# Patient Record
Sex: FEMALE | Race: WHITE | URBAN - NONMETROPOLITAN AREA
[De-identification: names, ages, dates, MRNs, and addresses within clinical notes are randomized per-mention and may not be internally consistent; named-entity substitution may affect disease eponyms.]

---

## 2019-03-09 LAB
ALBUMIN SERPL-MCNC: 4.5 G/DL
ALP BLD-CCNC: 37 U/L
ALT SERPL-CCNC: 14 U/L
ANION GAP SERPL CALCULATED.3IONS-SCNC: 1.9 MMOL/L
AST SERPL-CCNC: 19 U/L
BASOPHILS ABSOLUTE: 10 /ΜL
BASOPHILS RELATIVE PERCENT: 0 %
BILIRUB SERPL-MCNC: 0.5 MG/DL (ref 0.1–1.4)
BUN BLDV-MCNC: 19 MG/DL
CALCIUM SERPL-MCNC: 9.4 MG/DL
CHLORIDE BLD-SCNC: 105 MMOL/L
CHOLESTEROL, TOTAL: 207 MG/DL
CHOLESTEROL/HDL RATIO: 4.5
CO2: 27 MMOL/L
CREAT SERPL-MCNC: 0.86 MG/DL
EOSINOPHILS ABSOLUTE: 50 /ΜL
EOSINOPHILS RELATIVE PERCENT: 1 %
GFR CALCULATED: 82
GLUCOSE BLD-MCNC: 83 MG/DL
HCT VFR BLD CALC: 40 % (ref 36–46)
HDLC SERPL-MCNC: 46 MG/DL (ref 35–70)
HEMOGLOBIN: 13.4 G/DL (ref 12–16)
LDL CHOLESTEROL CALCULATED: 143 MG/DL (ref 0–160)
LYMPHOCYTES ABSOLUTE: 1580 /ΜL
LYMPHOCYTES RELATIVE PERCENT: 30 %
MCH RBC QN AUTO: 29.8 PG
MCHC RBC AUTO-ENTMCNC: 33.7 G/DL
MCV RBC AUTO: 88.87 FL
MONOCYTES ABSOLUTE: 410 /ΜL
MONOCYTES RELATIVE PERCENT: 8 %
NEUTROPHILS ABSOLUTE: 3270 /ΜL
NEUTROPHILS RELATIVE PERCENT: 61 %
PLATELET # BLD: 268 K/ΜL
PMV BLD AUTO: 9.6 FL
POTASSIUM SERPL-SCNC: 3.8 MMOL/L
RBC # BLD: 4.51 10^6/ΜL
SODIUM BLD-SCNC: 140 MMOL/L
TOTAL PROTEIN: 6.8
TRIGL SERPL-MCNC: 79 MG/DL
TSH SERPL DL<=0.05 MIU/L-ACNC: 2.79 UIU/ML
VLDLC SERPL CALC-MCNC: 161 MG/DL
WBC # BLD: 5.3 10^3/ML

## 2019-06-27 ENCOUNTER — OFFICE VISIT (OUTPATIENT)
Dept: FAMILY MEDICINE CLINIC | Age: 44
End: 2019-06-27
Payer: COMMERCIAL

## 2019-06-27 VITALS
DIASTOLIC BLOOD PRESSURE: 72 MMHG | HEIGHT: 64 IN | WEIGHT: 149 LBS | SYSTOLIC BLOOD PRESSURE: 124 MMHG | BODY MASS INDEX: 25.44 KG/M2 | HEART RATE: 79 BPM | OXYGEN SATURATION: 97 %

## 2019-06-27 DIAGNOSIS — L24.7 CONTACT DERMATITIS AND ECZEMA DUE TO PLANT: Primary | ICD-10-CM

## 2019-06-27 PROCEDURE — 99213 OFFICE O/P EST LOW 20 MIN: CPT | Performed by: INTERNAL MEDICINE

## 2019-06-27 PROCEDURE — 96372 THER/PROPH/DIAG INJ SC/IM: CPT | Performed by: INTERNAL MEDICINE

## 2019-06-27 RX ORDER — PREDNISONE 10 MG/1
TABLET ORAL
Qty: 30 TABLET | Refills: 0 | Status: SHIPPED | OUTPATIENT
Start: 2019-06-27 | End: 2019-08-30

## 2019-06-27 RX ORDER — METHYLPREDNISOLONE ACETATE 40 MG/ML
40 INJECTION, SUSPENSION INTRA-ARTICULAR; INTRALESIONAL; INTRAMUSCULAR; SOFT TISSUE ONCE
Status: COMPLETED | OUTPATIENT
Start: 2019-06-27 | End: 2019-06-27

## 2019-06-27 RX ORDER — ESCITALOPRAM OXALATE 10 MG/1
TABLET ORAL
Refills: 0 | COMMUNITY
Start: 2019-05-31 | End: 2019-09-04 | Stop reason: DRUGHIGH

## 2019-06-27 RX ADMIN — METHYLPREDNISOLONE ACETATE 40 MG: 40 INJECTION, SUSPENSION INTRA-ARTICULAR; INTRALESIONAL; INTRAMUSCULAR; SOFT TISSUE at 08:56

## 2019-06-27 NOTE — PROGRESS NOTES
serous drainage. She currently has he is covered. Lesions all typical of a contact dermatitis    ASSESSMENT/PLAN:    ICD-10-CM    1. Contact dermatitis and eczema due to plant L24.7       I given her a Depo-Medrol injection today to be followed tomorrow by tapering dose of prednisone. She may also use Benadryl or Zyrtec for some symptom relief. No follow-ups on file. An electronic signature was used to authenticate this note.     --Meron Beaver,  on 6/27/2019 at 8:58 AM

## 2019-09-04 ENCOUNTER — OFFICE VISIT (OUTPATIENT)
Dept: FAMILY MEDICINE CLINIC | Age: 44
End: 2019-09-04
Payer: COMMERCIAL

## 2019-09-04 VITALS
OXYGEN SATURATION: 99 % | TEMPERATURE: 98.4 F | HEART RATE: 84 BPM | BODY MASS INDEX: 26.67 KG/M2 | DIASTOLIC BLOOD PRESSURE: 72 MMHG | WEIGHT: 156.25 LBS | HEIGHT: 64 IN | SYSTOLIC BLOOD PRESSURE: 120 MMHG

## 2019-09-04 DIAGNOSIS — Z12.39 SCREENING FOR MALIGNANT NEOPLASM OF BREAST: ICD-10-CM

## 2019-09-04 DIAGNOSIS — R00.2 PALPITATIONS: ICD-10-CM

## 2019-09-04 DIAGNOSIS — F41.9 ANXIETY: Primary | ICD-10-CM

## 2019-09-04 PROCEDURE — 99214 OFFICE O/P EST MOD 30 MIN: CPT | Performed by: FAMILY MEDICINE

## 2019-09-04 RX ORDER — ESCITALOPRAM OXALATE 20 MG/1
20 TABLET ORAL DAILY
Qty: 30 TABLET | Refills: 5 | Status: SHIPPED
Start: 2019-09-04 | End: 2020-02-25 | Stop reason: SDUPTHER

## 2019-09-04 RX ORDER — ESCITALOPRAM OXALATE 10 MG/1
TABLET ORAL
Qty: 30 TABLET | Refills: 5 | Status: CANCELLED | OUTPATIENT
Start: 2019-09-04

## 2019-09-04 ASSESSMENT — ENCOUNTER SYMPTOMS
VOMITING: 0
SINUS PAIN: 0
DIARRHEA: 0
NAUSEA: 0
ABDOMINAL PAIN: 0
EYE PAIN: 0
SHORTNESS OF BREATH: 0
SORE THROAT: 0
CHEST TIGHTNESS: 0
COUGH: 0
CONSTIPATION: 0
TROUBLE SWALLOWING: 0
WHEEZING: 0
BACK PAIN: 0

## 2019-09-04 ASSESSMENT — PATIENT HEALTH QUESTIONNAIRE - PHQ9
SUM OF ALL RESPONSES TO PHQ9 QUESTIONS 1 & 2: 0
2. FEELING DOWN, DEPRESSED OR HOPELESS: 0
SUM OF ALL RESPONSES TO PHQ QUESTIONS 1-9: 0
SUM OF ALL RESPONSES TO PHQ QUESTIONS 1-9: 0
1. LITTLE INTEREST OR PLEASURE IN DOING THINGS: 0

## 2019-09-04 NOTE — PROGRESS NOTES
19    Name: Sandeep Anguiano  :1975   Sex:female   Age:44 y.o. Chief Complaint   Patient presents with    Anxiety     Here for follow up on anxiety. Patient says that she has been under more stress lately and is having panic attacks  . She feels like she is getting palpitations and gets chest pains. She says this has been happening about once a week. Work is her trigger right now  People are quitting and her work load is getting worse    Palpitations occasionally  No chaest pain  Stress  No IBS and no gastric issues    lexapro working but she agrees a higher dose might be helpful right now    Mammogram is this coming monday    Review of Systems   Constitutional: Negative for appetite change, fatigue and fever. HENT: Negative for congestion, ear pain, sinus pain, sore throat and trouble swallowing. Eyes: Negative for pain. Respiratory: Negative for cough, chest tightness, shortness of breath and wheezing. Cardiovascular: Positive for palpitations. Negative for chest pain and leg swelling. Gastrointestinal: Negative for abdominal pain, constipation, diarrhea, nausea and vomiting. Endocrine: Negative for cold intolerance and heat intolerance. Genitourinary: Negative for difficulty urinating, hematuria and pelvic pain. Musculoskeletal: Negative for back pain, gait problem and joint swelling. Skin: Negative for rash and wound. Neurological: Negative for dizziness, syncope and headaches. Hematological: Negative for adenopathy. Psychiatric/Behavioral: Negative for confusion, self-injury, sleep disturbance and suicidal ideas. The patient is nervous/anxious.             Current Outpatient Medications:     escitalopram (LEXAPRO) 20 MG tablet, Take 1 tablet by mouth daily, Disp: 30 tablet, Rfl: 5  Allergies   Allergen Reactions    Homatropine     Iodine     Penicillin G     Ranitidine Hives      Past Medical History:   Diagnosis Date    Anxiety     Gastritis     Irritable

## 2019-12-31 ENCOUNTER — OFFICE VISIT (OUTPATIENT)
Dept: FAMILY MEDICINE CLINIC | Age: 44
End: 2019-12-31
Payer: COMMERCIAL

## 2019-12-31 VITALS
OXYGEN SATURATION: 95 % | DIASTOLIC BLOOD PRESSURE: 62 MMHG | HEIGHT: 63 IN | TEMPERATURE: 98.3 F | BODY MASS INDEX: 30.88 KG/M2 | SYSTOLIC BLOOD PRESSURE: 116 MMHG | HEART RATE: 117 BPM | WEIGHT: 174.3 LBS

## 2019-12-31 DIAGNOSIS — J06.9 ACUTE URI: ICD-10-CM

## 2019-12-31 DIAGNOSIS — R50.9 FEVER, UNSPECIFIED FEVER CAUSE: Primary | ICD-10-CM

## 2019-12-31 LAB
INFLUENZA A ANTIBODY: NEGATIVE
INFLUENZA B ANTIBODY: NEGATIVE

## 2019-12-31 PROCEDURE — 99213 OFFICE O/P EST LOW 20 MIN: CPT | Performed by: PHYSICIAN ASSISTANT

## 2019-12-31 PROCEDURE — 87804 INFLUENZA ASSAY W/OPTIC: CPT | Performed by: PHYSICIAN ASSISTANT

## 2019-12-31 RX ORDER — AZITHROMYCIN 250 MG/1
250 TABLET, FILM COATED ORAL SEE ADMIN INSTRUCTIONS
Qty: 6 TABLET | Refills: 0 | Status: SHIPPED | OUTPATIENT
Start: 2019-12-31 | End: 2020-01-05

## 2019-12-31 RX ORDER — BROMPHENIRAMINE MALEATE, PSEUDOEPHEDRINE HYDROCHLORIDE, AND DEXTROMETHORPHAN HYDROBROMIDE 2; 30; 10 MG/5ML; MG/5ML; MG/5ML
5 SYRUP ORAL 4 TIMES DAILY PRN
Qty: 120 ML | Refills: 0 | Status: SHIPPED
Start: 2019-12-31 | End: 2020-02-24

## 2019-12-31 RX ORDER — PREDNISONE 20 MG/1
TABLET ORAL
Qty: 10 TABLET | Refills: 0 | Status: SHIPPED
Start: 2019-12-31 | End: 2020-02-24

## 2020-02-24 ENCOUNTER — OFFICE VISIT (OUTPATIENT)
Dept: FAMILY MEDICINE CLINIC | Age: 45
End: 2020-02-24
Payer: COMMERCIAL

## 2020-02-24 VITALS
HEIGHT: 63 IN | OXYGEN SATURATION: 98 % | WEIGHT: 165 LBS | BODY MASS INDEX: 29.23 KG/M2 | DIASTOLIC BLOOD PRESSURE: 88 MMHG | SYSTOLIC BLOOD PRESSURE: 130 MMHG | TEMPERATURE: 98.5 F | RESPIRATION RATE: 18 BRPM | HEART RATE: 73 BPM

## 2020-02-24 PROCEDURE — 99213 OFFICE O/P EST LOW 20 MIN: CPT | Performed by: PHYSICIAN ASSISTANT

## 2020-02-24 RX ORDER — METHYLPREDNISOLONE 4 MG/1
TABLET ORAL
Qty: 1 KIT | Refills: 0 | Status: SHIPPED
Start: 2020-02-24 | End: 2020-08-25 | Stop reason: ALTCHOICE

## 2020-02-24 ASSESSMENT — ENCOUNTER SYMPTOMS
RESPIRATORY NEGATIVE: 1
SHORTNESS OF BREATH: 0
STRIDOR: 0
EYES NEGATIVE: 1
GASTROINTESTINAL NEGATIVE: 1
SORE THROAT: 0
COUGH: 0
CHOKING: 0
CHEST TIGHTNESS: 0
WHEEZING: 0
FACIAL SWELLING: 0
APNEA: 0

## 2020-02-24 NOTE — PROGRESS NOTES
Chief Complaint   Patient presents with    Allergic Reaction     pt states her lips have been swelling, it is pt bottom lip. HPI:  Patient presents today for swelling of the lips over the last couple weeks on and off. She took a Claritin while in the waiting room and it did go down today. No shortness of breath, tongue or throat swelling. Current Outpatient Medications:     methylPREDNISolone (MEDROL DOSEPACK) 4 MG tablet, Take by mouth., Disp: 1 kit, Rfl: 0    escitalopram (LEXAPRO) 20 MG tablet, Take 1 tablet by mouth daily, Disp: 30 tablet, Rfl: 5       Allergies   Allergen Reactions    Homatropine     Iodine     Penicillin G     Ranitidine Hives         Review of Systems  Review of Systems   Constitutional: Negative. Negative for chills, fatigue and fever. HENT: Negative for congestion, facial swelling (Bottom lip slightly swollen) and sore throat. Eyes: Negative. Respiratory: Negative. Negative for apnea, cough, choking, chest tightness, shortness of breath, wheezing and stridor. Cardiovascular: Negative. Negative for chest pain and leg swelling. Gastrointestinal: Negative. VS:  /88   Pulse 73   Temp 98.5 °F (36.9 °C) (Temporal)   Resp 18   Ht 5' 3\" (1.6 m)   Wt 165 lb (74.8 kg)   SpO2 98%   BMI 29.23 kg/m²     Patient's medical, social, and family history reviewed      Physical Exam  Physical Exam  Vitals signs and nursing note reviewed. Constitutional:       General: She is not in acute distress. Appearance: Normal appearance. She is normal weight. She is not ill-appearing or toxic-appearing. HENT:      Head: Normocephalic. Right Ear: Tympanic membrane, ear canal and external ear normal. There is no impacted cerumen. Left Ear: Tympanic membrane, ear canal and external ear normal. There is no impacted cerumen. Nose: Nose normal. No congestion or rhinorrhea.       Mouth/Throat:      Mouth: Mucous membranes are moist. Angioedema

## 2020-02-25 ENCOUNTER — OFFICE VISIT (OUTPATIENT)
Dept: FAMILY MEDICINE CLINIC | Age: 45
End: 2020-02-25
Payer: COMMERCIAL

## 2020-02-25 VITALS
OXYGEN SATURATION: 96 % | SYSTOLIC BLOOD PRESSURE: 136 MMHG | HEART RATE: 90 BPM | TEMPERATURE: 97.9 F | HEIGHT: 63 IN | WEIGHT: 171 LBS | DIASTOLIC BLOOD PRESSURE: 86 MMHG | BODY MASS INDEX: 30.3 KG/M2

## 2020-02-25 PROCEDURE — 99396 PREV VISIT EST AGE 40-64: CPT | Performed by: FAMILY MEDICINE

## 2020-02-25 RX ORDER — PANTOPRAZOLE SODIUM 20 MG/1
20 TABLET, DELAYED RELEASE ORAL NIGHTLY
Qty: 90 TABLET | Refills: 1 | Status: SHIPPED
Start: 2020-02-25 | End: 2020-08-25 | Stop reason: SDUPTHER

## 2020-02-25 RX ORDER — ESCITALOPRAM OXALATE 20 MG/1
20 TABLET ORAL DAILY
Qty: 90 TABLET | Refills: 1 | Status: SHIPPED
Start: 2020-02-25 | End: 2020-08-25 | Stop reason: SDUPTHER

## 2020-02-25 ASSESSMENT — ENCOUNTER SYMPTOMS
WHEEZING: 0
CONSTIPATION: 0
SINUS PAIN: 0
SORE THROAT: 0
CHEST TIGHTNESS: 0
ABDOMINAL PAIN: 0
VOMITING: 0
BACK PAIN: 0
TROUBLE SWALLOWING: 0
DIARRHEA: 0
NAUSEA: 0
SHORTNESS OF BREATH: 0
EYE PAIN: 0
COUGH: 0

## 2020-02-25 ASSESSMENT — PATIENT HEALTH QUESTIONNAIRE - PHQ9
2. FEELING DOWN, DEPRESSED OR HOPELESS: 0
1. LITTLE INTEREST OR PLEASURE IN DOING THINGS: 0
SUM OF ALL RESPONSES TO PHQ QUESTIONS 1-9: 0
SUM OF ALL RESPONSES TO PHQ9 QUESTIONS 1 & 2: 0
SUM OF ALL RESPONSES TO PHQ QUESTIONS 1-9: 0

## 2020-02-25 NOTE — PROGRESS NOTES
round, and reactive to light. Neck:      Musculoskeletal: Normal range of motion. Cardiovascular:      Rate and Rhythm: Normal rate and regular rhythm. Pulmonary:      Effort: Pulmonary effort is normal.      Breath sounds: Normal breath sounds. Abdominal:      General: Bowel sounds are normal.      Palpations: Abdomen is soft. Musculoskeletal: Normal range of motion. Skin:     General: Skin is warm and dry. Neurological:      General: No focal deficit present. Mental Status: She is alert and oriented to person, place, and time. Bebe Sanon was seen today for anxiety. Diagnoses and all orders for this visit:    Encounter for well adult exam without abnormal findings  Comments:  labs this week  mammogram done    Anxiety  Comments:  stable with lexapro  Orders:  -     escitalopram (LEXAPRO) 20 MG tablet; Take 1 tablet by mouth daily    Palpitations  -     escitalopram (LEXAPRO) 20 MG tablet; Take 1 tablet by mouth daily    Gastroesophageal reflux disease with esophagitis  Comments:  add protonix and see if this helps  watch diet  f/u in 1 to 3 mths ifnecessary    Allergic reaction, subsequent encounter  Comments:  claritin daily  benedryl as needed  will refer to allergist    Other orders  -     pantoprazole (PROTONIX) 20 MG tablet; Take 1 tablet by mouth nightly        I independently reviewed and updated the chief complaint, HPI, past medical and surgical history, medications, allergies and ROS as entered by the LPN. Seen by:   Pawel Dunn DO

## 2020-07-16 ENCOUNTER — TELEPHONE (OUTPATIENT)
Dept: FAMILY MEDICINE CLINIC | Age: 45
End: 2020-07-16

## 2020-07-16 NOTE — TELEPHONE ENCOUNTER
[place]astity H[place] and has developed a UTI. She is asking if you would send an antibiotic to [facility] for this, and asking for a Diflucan as well.

## 2020-07-17 ENCOUNTER — HOSPITAL ENCOUNTER (OUTPATIENT)
Age: 45
Discharge: HOME | End: 2020-07-19
Payer: COMMERCIAL

## 2020-07-17 ENCOUNTER — OFFICE VISIT (OUTPATIENT)
Dept: FAMILY MEDICINE CLINIC | Age: 45
End: 2020-07-17
Payer: COMMERCIAL

## 2020-07-17 VITALS
HEART RATE: 80 BPM | DIASTOLIC BLOOD PRESSURE: 76 MMHG | HEIGHT: 64 IN | RESPIRATION RATE: 18 BRPM | OXYGEN SATURATION: 98 % | WEIGHT: 175 LBS | SYSTOLIC BLOOD PRESSURE: 120 MMHG | TEMPERATURE: 97.6 F | BODY MASS INDEX: 29.88 KG/M2

## 2020-07-17 LAB
BILIRUBIN, POC: NORMAL
BLOOD URINE, POC: NORMAL
CLARITY, POC: CLEAR
COLOR, POC: YELLOW
GLUCOSE URINE, POC: NORMAL
KETONES, POC: NORMAL
LEUKOCYTE EST, POC: NORMAL
NITRITE, POC: NEGATIVE
PH, POC: 7
PROTEIN, POC: NORMAL
SPECIFIC GRAVITY, POC: 1.01
UROBILINOGEN, POC: 0.2

## 2020-07-17 PROCEDURE — 87088 URINE BACTERIA CULTURE: CPT

## 2020-07-17 PROCEDURE — 81002 URINALYSIS NONAUTO W/O SCOPE: CPT | Performed by: PHYSICIAN ASSISTANT

## 2020-07-17 PROCEDURE — 87077 CULTURE AEROBIC IDENTIFY: CPT

## 2020-07-17 PROCEDURE — 99214 OFFICE O/P EST MOD 30 MIN: CPT | Performed by: PHYSICIAN ASSISTANT

## 2020-07-17 PROCEDURE — 87186 SC STD MICRODIL/AGAR DIL: CPT

## 2020-07-17 RX ORDER — FLUCONAZOLE 150 MG/1
TABLET ORAL
Qty: 2 TABLET | Refills: 0 | Status: SHIPPED
Start: 2020-07-17 | End: 2020-08-25 | Stop reason: ALTCHOICE

## 2020-07-17 RX ORDER — NITROFURANTOIN 25; 75 MG/1; MG/1
100 CAPSULE ORAL 2 TIMES DAILY
Qty: 14 CAPSULE | Refills: 0 | Status: SHIPPED | OUTPATIENT
Start: 2020-07-17 | End: 2020-07-24

## 2020-07-17 NOTE — PROGRESS NOTES
20  Jose Kaplan : 1975 Sex: female  Age 39 y.o. Subjective:  Chief Complaint   Patient presents with    Urinary Tract Infection     pt states dysuria, frequency for past 16 days          HPI:   Jose Kaplan , 39 y.o. female presents to [facility] care for evaluation of urinary tract infection. The patient has had a urinary tract infection ongoing for about 16 days now. The patient is not having any back pain, flank pain. The patient states that she had been trying to treated at home. The patient not having any fevers or chills. The patient denies any vaginal bleeding or vaginal discharge. The patient has had some increased frequency, urgency and burning with urination. The patient does get UTIs somewhat frequently. The patient has not been on any recent antibiotics. She has not been seen or evaluated for this previously. The patient has had previous tubal ligation. Denies chance of pregnancy. ROS:   Unless otherwise stated in this report the patient's positive and negative responses for review of systems for constitutional, eyes, ENT, cardiovascular, respiratory, gastrointestinal, neurological, , musculoskeletal, and integument systems and related systems to the presenting problem are either stated in the history of present illness or were not pertinent or were negative for the symptoms and/or complaints related to the presenting medical problem. Positives and pertinent negatives as per HPI. All others reviewed and are negative. PMH:     Past Medical History:   Diagnosis Date    Anxiety     Gastritis     Irritable bowel syndrome     Panic attack        Past Surgical History:   Procedure Laterality Date    NECK SURGERY  1996    lipoma removal    TUBAL LIGATION      WRIST SURGERY Right     ganglion cyst       No family history on file.     Medications:     Current Outpatient Medications:     nitrofurantoin, macrocrystal-monohydrate, (MACROBID) 100 MG capsule, Take 1 capsule by mouth 2 times daily for 7 days, Disp: 14 capsule, Rfl: 0    fluconazole (DIFLUCAN) 150 MG tablet, Take 1 now and then another at the end of the course of the antibiotic, Disp: 2 tablet, Rfl: 0    escitalopram (LEXAPRO) 20 MG tablet, Take 1 tablet by mouth daily, Disp: 90 tablet, Rfl: 1    pantoprazole (PROTONIX) 20 MG tablet, Take 1 tablet by mouth nightly, Disp: 90 tablet, Rfl: 1    methylPREDNISolone (MEDROL DOSEPACK) 4 MG tablet, Take by mouth. (Patient not taking: Reported on 7/17/2020), Disp: 1 kit, Rfl: 0    Allergies: Allergies   Allergen Reactions    Homatropine     Iodine     Penicillin G     Ranitidine Hives       Social History:     Social History     Tobacco Use    Smoking status: Never Smoker    Smokeless tobacco: Never Used   Substance Use Topics    Alcohol use: Yes     Comment: occasionally    Drug use: Never       Patient lives at home. Physical Exam:     Vitals:    07/17/20 0836   BP: 120/76   Pulse: 80   Resp: 18   Temp: 97.6 °F (36.4 °C)   SpO2: 98%   Weight: 175 lb (79.4 kg)   Height: 5' 4\" (1.626 m)       Exam:  Physical Exam  Nurses note and vital signs reviewed and patient is not hypoxic. General: The patient appears well and in no apparent distress. Patient is resting comfortably on cart. Skin: Warm, dry, no pallor noted. There is no rash noted. Head: Normocephalic, atraumatic. Eye: Normal conjunctiva  Ears, Nose, Mouth, and Throat: Oral mucosa is moist  Cardiovascular: Regular Rate and Rhythm  Respiratory: Patient is in no distress, no accessory muscle use, lungs are clear to auscultation, no wheezing, crackles or rhonchi  Back: Non-tender, no CVA tenderness bilaterally to percussion. GI: Normal bowel sounds, no tenderness to palpation, no masses appreciated. No rebound, guarding, or rigidity noted.   Musculoskeletal: The patient has no evidence of calf tenderness, no pitting edema, symmetrical pulses noted bilaterally  Neurological: A&O x4, normal speech        Testing:     Results for orders placed or performed in visit on 07/17/20   POCT Urinalysis no Micro   Result Value Ref Range    Color, UA yellow     Clarity, UA clear     Glucose, UA POC neg     Bilirubin, UA neg     Ketones, UA neg     Spec Grav, UA 1.015     Blood, UA POC trace     pH, UA 7.0     Protein, UA POC trace     Urobilinogen, UA 0.2     Leukocytes, UA small     Nitrite, UA negative            Medical Decision Making:     Vital signs reviewed    Past medical history reviewed. Allergies reviewed. Medications reviewed. Patient on arrival does not appear to be in any apparent distress or discomfort. . The patient had UA that did show evidence of a UTI. We did obtain a urine culture, which was sent to the lab for further evaluation. She denies chance of pregnancy. Had previous tubal ligation    Urinalysis did show evidence of a urinary tract infection. The patient will be started on Macrobid. The patient will have a urine culture set up. The patient will need a follow-up appointment with PCP to have repeat urinalysis. The patient is to follow up with PCP for results and we will make any necessary adjustments to the patient's medication regimen. The patient will go directly to ED if notes nausea, vomiting, back pain, fever, chills or worsening symptoms. The patient has no other concerns at this time. Clinical Impression:   Jean Troy was seen today for urinary tract infection. Diagnoses and all orders for this visit:    Urinary tract infection with hematuria, site unspecified    Dysuria    Other orders  -     POCT Urinalysis no Micro  -     Culture, Urine; Future  -     nitrofurantoin, macrocrystal-monohydrate, (MACROBID) 100 MG capsule; Take 1 capsule by mouth 2 times daily for 7 days  -     fluconazole (DIFLUCAN) 150 MG tablet;  Take 1 now and then another at the end of the course of the antibiotic        The patient is to call for any concerns or return if any of the signs or symptoms worsen. The patient is to follow-up with PCP in the next 2-3 days for repeat evaluation repeat assessment or go directly to the emergency department.      SIGNATURE: Rita Meza III, PA-C

## 2020-07-20 LAB
ORGANISM: ABNORMAL
URINE CULTURE, ROUTINE: ABNORMAL

## 2020-08-25 ENCOUNTER — OFFICE VISIT (OUTPATIENT)
Dept: FAMILY MEDICINE CLINIC | Age: 45
End: 2020-08-25
Payer: COMMERCIAL

## 2020-08-25 VITALS
BODY MASS INDEX: 30.72 KG/M2 | WEIGHT: 173.4 LBS | HEIGHT: 63 IN | SYSTOLIC BLOOD PRESSURE: 110 MMHG | OXYGEN SATURATION: 98 % | HEART RATE: 80 BPM | TEMPERATURE: 98.3 F | DIASTOLIC BLOOD PRESSURE: 70 MMHG

## 2020-08-25 PROCEDURE — 99214 OFFICE O/P EST MOD 30 MIN: CPT | Performed by: FAMILY MEDICINE

## 2020-08-25 PROCEDURE — 96372 THER/PROPH/DIAG INJ SC/IM: CPT | Performed by: FAMILY MEDICINE

## 2020-08-25 RX ORDER — ESCITALOPRAM OXALATE 20 MG/1
20 TABLET ORAL DAILY
Qty: 90 TABLET | Refills: 1 | Status: SHIPPED
Start: 2020-08-25 | End: 2021-02-23 | Stop reason: SDUPTHER

## 2020-08-25 RX ORDER — METHYLPREDNISOLONE ACETATE 80 MG/ML
80 INJECTION, SUSPENSION INTRA-ARTICULAR; INTRALESIONAL; INTRAMUSCULAR; SOFT TISSUE ONCE
Status: COMPLETED | OUTPATIENT
Start: 2020-08-25 | End: 2020-08-25

## 2020-08-25 RX ORDER — PANTOPRAZOLE SODIUM 20 MG/1
20 TABLET, DELAYED RELEASE ORAL NIGHTLY
Qty: 90 TABLET | Refills: 1 | Status: SHIPPED
Start: 2020-08-25 | End: 2021-02-23 | Stop reason: SDUPTHER

## 2020-08-25 RX ADMIN — METHYLPREDNISOLONE ACETATE 80 MG: 80 INJECTION, SUSPENSION INTRA-ARTICULAR; INTRALESIONAL; INTRAMUSCULAR; SOFT TISSUE at 08:53

## 2020-08-25 ASSESSMENT — ENCOUNTER SYMPTOMS
CONSTIPATION: 0
BACK PAIN: 0
VOMITING: 0
SINUS PAIN: 0
COUGH: 0
DIARRHEA: 0
TROUBLE SWALLOWING: 0
WHEEZING: 0
CHEST TIGHTNESS: 0
ABDOMINAL PAIN: 0
SORE THROAT: 0
EYE PAIN: 0
SHORTNESS OF BREATH: 0
NAUSEA: 0

## 2020-08-25 NOTE — PROGRESS NOTES
20    Name: Alena Winters  :1975   Sex:female   Age:45 y.o. Chief Complaint   Patient presents with    Anxiety    Poison Ivy    Allergic Reaction     Patient presents to office for visit. She says that her current dose of Lexapro is effective. She has had no panic attacks or issues with anxiety since her last appointment. Patient did have her lips swell once since last appointment, she had eaten a mixture of frozen strawberries, blueberries, and rasberries. Patient had to take Claritin and Benadryl and it took a few hours for the lip swelling to subside. She would like a referral to allergist, specifically Dr. Diana Bravo at the [facility]. Patient had one incident of gastritis last month after drinking lemonade, she says the pain was so severe she almost went to the ER. She doubled her Protonix dose and took several Tums until the pain subsided. Patient has had poison ivy rash since , she would like a steroid injection, the rash is continuing to spread. Patient is not sure if due for mammo, if so, she would like an order to take to [facility] where she has had mammograms previously. Patient here for check up    Still needs labs   Not done in March due to covid    Hyperlipidemia  Continue to watch diet and rech in 6 months    Anxiety has been great  Stable with the ecitalopram    posion ivy  Was init ove the weekend and started with rash Tuesday  She would like a shot  Using topical rx and not getting better    gerd still an issues  Has to watch her diet and take meds daily  But when she does this everything is good    Due for mammogram as well        Review of Systems   Constitutional: Negative for appetite change, fatigue and fever. HENT: Negative for congestion, ear pain, sinus pain, sore throat and trouble swallowing. Eyes: Negative for pain. Respiratory: Negative for cough, chest tightness, shortness of breath and wheezing.     Cardiovascular: Negative for Physical Exam  Vitals signs and nursing note reviewed. Constitutional:       Appearance: Normal appearance. She is well-developed. HENT:      Head: Normocephalic and atraumatic. Right Ear: Tympanic membrane normal.      Left Ear: Tympanic membrane normal.      Nose: Nose normal.      Mouth/Throat:      Mouth: Mucous membranes are moist.   Eyes:      Pupils: Pupils are equal, round, and reactive to light. Neck:      Musculoskeletal: Normal range of motion. Cardiovascular:      Rate and Rhythm: Normal rate and regular rhythm. Pulmonary:      Effort: Pulmonary effort is normal.      Breath sounds: Normal breath sounds. Abdominal:      General: Bowel sounds are normal.      Palpations: Abdomen is soft. Musculoskeletal:      Comments: Gait normal in the office today   Skin:     General: Skin is warm and dry. Findings: Rash present. Comments: Few scattered areas of red blistery rash on left ankle right forearm, left upper arm   Neurological:      General: No focal deficit present. Mental Status: She is alert and oriented to person, place, and time. Psychiatric:         Mood and Affect: Mood normal.         Thought Content: Thought content normal.          Najma Silver was seen today for anxiety, poison ivy and allergic reaction. Diagnoses and all orders for this visit:    Anxiety  Comments:  stable with lexapro  Orders:  -     escitalopram (LEXAPRO) 20 MG tablet; Take 1 tablet by mouth daily    Mixed hyperlipidemia  Comments:  counseled about diet and increased movmeent  repeat labs in 6 months    Gastroesophageal reflux disease with esophagitis  Comments:  continues protonix  really has to watch her diet  tried drinking lemonade last week and felt like she was having a heart attack    Palpitations  -     escitalopram (LEXAPRO) 20 MG tablet;  Take 1 tablet by mouth daily    Encounter for screening mammogram for breast cancer  -     [facility] DIGITAL SCREEN W OR WO CAD BILATERAL;

## 2020-09-25 DIAGNOSIS — Z12.31 ENCOUNTER FOR SCREENING MAMMOGRAM FOR BREAST CANCER: ICD-10-CM

## 2020-10-14 ENCOUNTER — OFFICE VISIT (OUTPATIENT)
Dept: FAMILY MEDICINE CLINIC | Age: 45
End: 2020-10-14
Payer: COMMERCIAL

## 2020-10-14 VITALS
HEIGHT: 63 IN | DIASTOLIC BLOOD PRESSURE: 70 MMHG | WEIGHT: 178.2 LBS | BODY MASS INDEX: 31.57 KG/M2 | TEMPERATURE: 97.9 F | OXYGEN SATURATION: 98 % | HEART RATE: 90 BPM | SYSTOLIC BLOOD PRESSURE: 120 MMHG

## 2020-10-14 PROCEDURE — 99214 OFFICE O/P EST MOD 30 MIN: CPT | Performed by: FAMILY MEDICINE

## 2020-10-14 ASSESSMENT — ENCOUNTER SYMPTOMS
SINUS PAIN: 0
COUGH: 0
TROUBLE SWALLOWING: 0
SHORTNESS OF BREATH: 0
SORE THROAT: 0
VOMITING: 0
DIARRHEA: 0
CONSTIPATION: 0
EYE PAIN: 0
ABDOMINAL PAIN: 0
CHEST TIGHTNESS: 0
NAUSEA: 0
WHEEZING: 0
BACK PAIN: 0

## 2020-10-14 NOTE — PROGRESS NOTES
10/14/20    Name: Nilam Gilmore  :1975   Sex:female   Age:45 y.o. Chief Complaint   Patient presents with    Sleep Apnea     Patient presents to office for visit. She would like to have a sleep study done. Patient says that her  won't sleep in the same room with her because her snoring has become more severe. Patient says that her  has reported that she has stopped breathing at night. Patient has daytime sleepiness, she used to work an hour away and would fall asleep on her drive home. Her neck circumference is 13\". 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  She just feels tired all the time    Her weight has gone up in 2019 she weight 149 and now weighs 178  Her  has also told her in the last year her snoring is a lot worse than previously    Anxiety has been well controlled  She does not exercise  Tries to watch her diet but is not always successful with this  I did advise her some weight loss would greatly help    She would like to do a sleep test to see what is available if she has sleep apnea  She cannot remember how bad her snoring was when she was thinner though            Review of Systems   Constitutional: Negative for appetite change, fatigue and fever. HENT: Negative for congestion, ear pain, sinus pain, sore throat and trouble swallowing. Eyes: Negative for pain. Respiratory: Negative for cough, chest tightness, shortness of breath and wheezing. Cardiovascular: Negative for chest pain, palpitations and leg swelling. Gastrointestinal: Negative for abdominal pain, constipation, diarrhea, nausea and vomiting.    Endocrine: Negative for cold intolerance and heat intolerance. Genitourinary: Negative for difficulty urinating, dysuria, frequency, hematuria, pelvic pain and urgency. Musculoskeletal: Negative for arthralgias, back pain, gait problem, joint swelling and myalgias. Skin: Negative for rash and wound. Neurological: Negative for dizziness, syncope, light-headedness and headaches. Hematological: Negative for adenopathy. Psychiatric/Behavioral: Positive for sleep disturbance. Negative for confusion, self-injury and suicidal ideas. The patient is not nervous/anxious. Current Outpatient Medications:     escitalopram (LEXAPRO) 20 MG tablet, Take 1 tablet by mouth daily, Disp: 90 tablet, Rfl: 1    pantoprazole (PROTONIX) 20 MG tablet, Take 1 tablet by mouth nightly, Disp: 90 tablet, Rfl: 1  Allergies   Allergen Reactions    Homatropine     Iodine     Penicillin G     Ranitidine Hives      Past Medical History:   Diagnosis Date    Anxiety     Gastritis     Irritable bowel syndrome     Panic attack      Patient Active Problem List    Diagnosis Date Noted    Anxiety 09/04/2019      Past Surgical History:   Procedure Laterality Date    NECK SURGERY  1996    lipoma removal    TUBAL LIGATION      WRIST SURGERY Right     ganglion cyst      Social History     Tobacco History     Smoking Status  Never Smoker    Smokeless Tobacco Use  Never Used          Alcohol History     Alcohol Use Status  Yes Comment  occasionally          Drug Use     Drug Use Status  Never          Sexual Activity     Sexually Active  Yes Partners  Male Comment  -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 Constitutional:       Appearance: Normal appearance. She is well-developed. Comments: overweight   HENT:      Head: Normocephalic and atraumatic. Right Ear: Tympanic membrane normal.      Left Ear: Tympanic membrane normal.      Nose: Nose normal.      Mouth/Throat:      Mouth: Mucous membranes are moist.   Eyes:      Pupils: Pupils are equal, round, and reactive to light. Neck:      Musculoskeletal: Normal range of motion. Cardiovascular:      Rate and Rhythm: Normal rate and regular rhythm. Pulmonary:      Effort: Pulmonary effort is normal.      Breath sounds: Normal breath sounds. Abdominal:      General: Bowel sounds are normal.      Palpations: Abdomen is soft. Musculoskeletal:      Comments: Gait normal in the office today   Skin:     General: Skin is warm and dry. Neurological:      General: No focal deficit present. Mental Status: She is alert and oriented to person, place, and time. Psychiatric:         Mood and Affect: Mood normal.         Thought Content: Thought content normal.          Marta Lu was seen today for sleep apnea. Diagnoses and all orders for this visit:    Obstructive sleep apnea syndrome  Comments:  will refer for sleep test due to s/s above  and then treatment as indicated by testing    Anxiety  Comments:  stable  no changes    Class 1 obesity due to excess calories with serious comorbidity and body mass index (BMI) of 31.0 to 31.9 in adult  Comments:  advised diet and exercise  increase movement    referred for sleep test  F/u as needed  Has an appt in February for check up    I independently reviewed and updated the chief complaint, HPI, past medical and surgical history, medications, allergies and ROS as entered by the LPN. Seen by:   Jayy Caputo DO

## 2021-02-18 DIAGNOSIS — Z00.00 ENCOUNTER FOR WELL ADULT EXAM WITHOUT ABNORMAL FINDINGS: ICD-10-CM

## 2021-02-18 DIAGNOSIS — Z13.29 THYROID DISORDER SCREENING: ICD-10-CM

## 2021-02-18 DIAGNOSIS — Z13.220 ENCOUNTER FOR SCREENING FOR LIPID DISORDER: ICD-10-CM

## 2021-02-18 LAB
ALBUMIN SERPL-MCNC: 4.4 G/DL (ref 3.5–5.2)
ALP BLD-CCNC: 41 U/L (ref 35–104)
ALT SERPL-CCNC: 21 U/L (ref 0–32)
ANION GAP SERPL CALCULATED.3IONS-SCNC: 10 MMOL/L (ref 7–16)
AST SERPL-CCNC: 27 U/L (ref 0–31)
BASOPHILS ABSOLUTE: 0.08 E9/L (ref 0–0.2)
BASOPHILS RELATIVE PERCENT: 2.1 % (ref 0–2)
BILIRUB SERPL-MCNC: 0.6 MG/DL (ref 0–1.2)
BUN BLDV-MCNC: 8 MG/DL (ref 6–20)
CALCIUM SERPL-MCNC: 9.5 MG/DL (ref 8.6–10.2)
CHLORIDE BLD-SCNC: 106 MMOL/L (ref 98–107)
CHOLESTEROL, TOTAL: 217 MG/DL (ref 0–199)
CO2: 27 MMOL/L (ref 22–29)
CREAT SERPL-MCNC: 0.9 MG/DL (ref 0.5–1)
EOSINOPHILS ABSOLUTE: 0.23 E9/L (ref 0.05–0.5)
EOSINOPHILS RELATIVE PERCENT: 5.9 % (ref 0–6)
GFR AFRICAN AMERICAN: >60
GFR NON-AFRICAN AMERICAN: >60 ML/MIN/1.73
GLUCOSE BLD-MCNC: 96 MG/DL (ref 74–99)
HCT VFR BLD CALC: 43.6 % (ref 34–48)
HDLC SERPL-MCNC: 62 MG/DL
HEMOGLOBIN: 14.1 G/DL (ref 11.5–15.5)
IMMATURE GRANULOCYTES #: 0.01 E9/L
IMMATURE GRANULOCYTES %: 0.3 % (ref 0–5)
LDL CHOLESTEROL CALCULATED: 133 MG/DL (ref 0–99)
LYMPHOCYTES ABSOLUTE: 0.77 E9/L (ref 1.5–4)
LYMPHOCYTES RELATIVE PERCENT: 19.9 % (ref 20–42)
MCH RBC QN AUTO: 29.8 PG (ref 26–35)
MCHC RBC AUTO-ENTMCNC: 32.3 % (ref 32–34.5)
MCV RBC AUTO: 92.2 FL (ref 80–99.9)
MONOCYTES ABSOLUTE: 0.47 E9/L (ref 0.1–0.95)
MONOCYTES RELATIVE PERCENT: 12.1 % (ref 2–12)
NEUTROPHILS ABSOLUTE: 2.31 E9/L (ref 1.8–7.3)
NEUTROPHILS RELATIVE PERCENT: 59.7 % (ref 43–80)
PDW BLD-RTO: 11.9 FL (ref 11.5–15)
PLATELET # BLD: 239 E9/L (ref 130–450)
PMV BLD AUTO: 11.3 FL (ref 7–12)
POTASSIUM SERPL-SCNC: 4.1 MMOL/L (ref 3.5–5)
RBC # BLD: 4.73 E12/L (ref 3.5–5.5)
SODIUM BLD-SCNC: 143 MMOL/L (ref 132–146)
TOTAL PROTEIN: 7 G/DL (ref 6.4–8.3)
TRIGL SERPL-MCNC: 108 MG/DL (ref 0–149)
TSH SERPL DL<=0.05 MIU/L-ACNC: 2.74 UIU/ML (ref 0.27–4.2)
VLDLC SERPL CALC-MCNC: 22 MG/DL
WBC # BLD: 3.9 E9/L (ref 4.5–11.5)

## 2021-02-23 ENCOUNTER — OFFICE VISIT (OUTPATIENT)
Dept: FAMILY MEDICINE CLINIC | Age: 46
End: 2021-02-23
Payer: COMMERCIAL

## 2021-02-23 VITALS
SYSTOLIC BLOOD PRESSURE: 118 MMHG | WEIGHT: 165 LBS | OXYGEN SATURATION: 96 % | HEART RATE: 90 BPM | DIASTOLIC BLOOD PRESSURE: 60 MMHG | HEIGHT: 63 IN | TEMPERATURE: 98 F | BODY MASS INDEX: 29.23 KG/M2

## 2021-02-23 DIAGNOSIS — R00.2 PALPITATIONS: ICD-10-CM

## 2021-02-23 DIAGNOSIS — Z00.00 ENCOUNTER FOR WELL ADULT EXAM WITHOUT ABNORMAL FINDINGS: Primary | ICD-10-CM

## 2021-02-23 DIAGNOSIS — E78.2 MIXED HYPERLIPIDEMIA: ICD-10-CM

## 2021-02-23 DIAGNOSIS — F41.9 ANXIETY: ICD-10-CM

## 2021-02-23 DIAGNOSIS — K29.40 ATROPHIC GASTRITIS WITHOUT HEMORRHAGE: ICD-10-CM

## 2021-02-23 PROCEDURE — 99396 PREV VISIT EST AGE 40-64: CPT | Performed by: FAMILY MEDICINE

## 2021-02-23 RX ORDER — ESCITALOPRAM OXALATE 20 MG/1
20 TABLET ORAL DAILY
Qty: 90 TABLET | Refills: 1 | Status: SHIPPED
Start: 2021-02-23 | End: 2021-08-19 | Stop reason: SDUPTHER

## 2021-02-23 RX ORDER — PANTOPRAZOLE SODIUM 20 MG/1
20 TABLET, DELAYED RELEASE ORAL NIGHTLY
Qty: 90 TABLET | Refills: 1 | Status: SHIPPED
Start: 2021-02-23 | End: 2021-08-19 | Stop reason: SDUPTHER

## 2021-02-23 ASSESSMENT — ENCOUNTER SYMPTOMS
CHEST TIGHTNESS: 0
SORE THROAT: 0
WHEEZING: 0
COUGH: 0
SINUS PAIN: 0
ABDOMINAL PAIN: 0
SHORTNESS OF BREATH: 0
EYE PAIN: 0
VOMITING: 0
TROUBLE SWALLOWING: 0
BACK PAIN: 0
CONSTIPATION: 0
DIARRHEA: 0
NAUSEA: 0

## 2021-02-23 ASSESSMENT — PATIENT HEALTH QUESTIONNAIRE - PHQ9: 2. FEELING DOWN, DEPRESSED OR HOPELESS: 0

## 2021-02-23 NOTE — PROGRESS NOTES
21    Name: Jon Sanchez  :1975   Sex:female   Age:46 y.o. Chief Complaint   Patient presents with    Hyperlipidemia    Anxiety     Patient presents to office for visit. She did have labs done. Patient says her current medications have been working for her. She says her menses have become very heavy and painful. She has not had a gyn exam in about 7-8 years. Patient has been losing weight, she is down 20 pounds from [place]. Here for a follow up on anxiety and other chronic issues    Anxiety has been really good with the lexapro  She is feeling better  No current issues    Weight is much better as well  She is doing intermitent fasting and watching what she dos eat  And is down 15 to 20 pounds since ayanna    Hyperlipidemia  A little better with weight loss  Will recheck in 6 months and see where she is  ldl is still a little elevated  Encouraged her to continue with diet and we will recheck it    Also exercise improtant even if its only 3 to 4 days a week 30 minutes would help greatly    Gastritis is much better with pantoprazole as well  Watching diet helps but no longer having pain at all  Stomach feels much better        Review of Systems   Constitutional: Negative for appetite change, fatigue and fever. HENT: Negative for congestion, ear pain, sinus pain, sore throat and trouble swallowing. Eyes: Negative for pain. Respiratory: Negative for cough, chest tightness, shortness of breath and wheezing. Cardiovascular: Negative for chest pain, palpitations and leg swelling. Gastrointestinal: Negative for abdominal pain, constipation, diarrhea, nausea and vomiting. Endocrine: Negative for cold intolerance and heat intolerance. Genitourinary: Negative for difficulty urinating, dysuria, frequency, hematuria and pelvic pain. Musculoskeletal: Negative for arthralgias, back pain, gait problem, joint swelling and myalgias. Skin: Negative for rash and wound. Neurological: Negative for dizziness, syncope, light-headedness and headaches. Hematological: Negative for adenopathy. Psychiatric/Behavioral: Negative for confusion, dysphoric mood, self-injury, sleep disturbance and suicidal ideas. The patient is not nervous/anxious. Current Outpatient Medications:     escitalopram (LEXAPRO) 20 MG tablet, Take 1 tablet by mouth daily, Disp: 90 tablet, Rfl: 1    pantoprazole (PROTONIX) 20 MG tablet, Take 1 tablet by mouth nightly, Disp: 90 tablet, Rfl: 1  Allergies   Allergen Reactions    Homatropine     Iodine     Penicillin G     Ranitidine Hives      Past Medical History:   Diagnosis Date    Anxiety     Gastritis     Irritable bowel syndrome     Panic attack      Patient Active Problem List    Diagnosis Date Noted    Mixed hyperlipidemia 02/23/2021    Gastritis     Anxiety 09/04/2019      Past Surgical History:   Procedure Laterality Date    NECK SURGERY  1996    lipoma removal    TUBAL LIGATION      WRIST SURGERY Right     ganglion cyst      Social History     Tobacco History     Smoking Status  Never Smoker    Smokeless Tobacco Use  Never Used          Alcohol History     Alcohol Use Status  Yes Comment  occasionally          Drug Use     Drug Use Status  Never          Sexual Activity     Sexually Active  Yes Partners  Male Comment  -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 Constitutional:       General: She is not in acute distress. Appearance: Normal appearance. She is well-developed. She is not ill-appearing. HENT:      Head: Normocephalic and atraumatic.       Right Ear: Tympanic membrane normal.      Left Ear: Tympanic membrane normal.      Nose: Nose normal.      Mouth/Throat: Mouth: Mucous membranes are moist.   Eyes:      Pupils: Pupils are equal, round, and reactive to light. Neck:      Musculoskeletal: Normal range of motion. Cardiovascular:      Rate and Rhythm: Normal rate and regular rhythm. Pulmonary:      Effort: Pulmonary effort is normal.      Breath sounds: Normal breath sounds. Abdominal:      General: Bowel sounds are normal.      Palpations: Abdomen is soft. Musculoskeletal:      Comments: Gait normal in the office today   Skin:     General: Skin is warm and dry. Neurological:      General: No focal deficit present. Mental Status: She is alert and oriented to person, place, and time. Psychiatric:         Mood and Affect: Mood normal.         Thought Content: Thought content normal.          Arpan John was seen today for hyperlipidemia and anxiety. Diagnoses and all orders for this visit:    Encounter for well adult exam without abnormal findings    Anxiety  Comments:  stable with lexapro  feels much better  continue  meds  exercising occasionally helps too  Orders:  -     escitalopram (LEXAPRO) 20 MG tablet; Take 1 tablet by mouth daily    Palpitations  Comments:  much better since anxiety better  Orders:  -     escitalopram (LEXAPRO) 20 MG tablet; Take 1 tablet by mouth daily    Mixed hyperlipidemia  Comments:  a little better since weight coming down  recheck in 6 months  since she is doing intermitent fasting  continue to watch diet  Orders:  -     CBC Auto Differential; Future  -     Comprehensive Metabolic Panel; Future  -     Lipid Panel; Future    Atrophic gastritis without hemorrhage  Comments:  much better with pantoprazole  continue this no changes    Other orders  -     pantoprazole (PROTONIX) 20 MG tablet;  Take 1 tablet by mouth nightly    labs in 6 months  Continue meds  appt in 6 months I independently reviewed and updated the chief complaint, HPI, past medical and surgical history, medications, allergies and ROS as entered by the LPN. Seen by:   Lien Campos, DO

## 2021-07-12 ENCOUNTER — OFFICE VISIT (OUTPATIENT)
Dept: FAMILY MEDICINE CLINIC | Age: 46
End: 2021-07-12
Payer: COMMERCIAL

## 2021-07-12 VITALS
BODY MASS INDEX: 29.06 KG/M2 | TEMPERATURE: 97.3 F | SYSTOLIC BLOOD PRESSURE: 124 MMHG | OXYGEN SATURATION: 100 % | RESPIRATION RATE: 16 BRPM | WEIGHT: 164 LBS | DIASTOLIC BLOOD PRESSURE: 80 MMHG | HEIGHT: 63 IN | HEART RATE: 74 BPM

## 2021-07-12 DIAGNOSIS — R21 RASH AND NONSPECIFIC SKIN ERUPTION: Primary | ICD-10-CM

## 2021-07-12 PROCEDURE — 99213 OFFICE O/P EST LOW 20 MIN: CPT | Performed by: PHYSICIAN ASSISTANT

## 2021-07-12 PROCEDURE — 96372 THER/PROPH/DIAG INJ SC/IM: CPT | Performed by: PHYSICIAN ASSISTANT

## 2021-07-12 RX ORDER — METHYLPREDNISOLONE ACETATE 80 MG/ML
60 INJECTION, SUSPENSION INTRA-ARTICULAR; INTRALESIONAL; INTRAMUSCULAR; SOFT TISSUE ONCE
Status: COMPLETED | OUTPATIENT
Start: 2021-07-12 | End: 2021-07-12

## 2021-07-12 RX ORDER — PREDNISONE 10 MG/1
TABLET ORAL
Qty: 18 TABLET | Refills: 0 | Status: SHIPPED
Start: 2021-07-12 | End: 2021-07-26 | Stop reason: ALTCHOICE

## 2021-07-12 RX ADMIN — METHYLPREDNISOLONE ACETATE 60 MG: 80 INJECTION, SUSPENSION INTRA-ARTICULAR; INTRALESIONAL; INTRAMUSCULAR; SOFT TISSUE at 11:25

## 2021-07-12 NOTE — PROGRESS NOTES
21  Jacob Benton : 1975 Sex: female  Age 55 y.o. Subjective:  Chief Complaint   Patient presents with    Rash     rash on arms, neck and waist          HPI:   Jacob Benton , 55 y.o. female presents to express care for evaluation of rash on arms, neck, waist.  The patient believes that she has poison ivy. The patient was doing some yard work and started noticing the rash over the weekend. The patient has now noted a few areas noted to the arms, neck, and waist area. The patient notes that it does itch. No pain or burning. No chest pain, shortness of breath. No lip or tongue swelling. Patient denies any other complaints      ROS:   Unless otherwise stated in this report the patient's positive and negative responses for review of systems for constitutional, eyes, ENT, cardiovascular, respiratory, gastrointestinal, neurological, , musculoskeletal, and integument systems and related systems to the presenting problem are either stated in the history of present illness or were not pertinent or were negative for the symptoms and/or complaints related to the presenting medical problem. Positives and pertinent negatives as per HPI. All others reviewed and are negative. PMH:     Past Medical History:   Diagnosis Date    Anxiety     Gastritis     Irritable bowel syndrome     Panic attack        Past Surgical History:   Procedure Laterality Date    NECK SURGERY  1996    lipoma removal    TUBAL LIGATION      WRIST SURGERY Right     ganglion cyst       No family history on file. Medications:     Current Outpatient Medications:     escitalopram (LEXAPRO) 20 MG tablet, Take 1 tablet by mouth daily, Disp: 90 tablet, Rfl: 1    pantoprazole (PROTONIX) 20 MG tablet, Take 1 tablet by mouth nightly, Disp: 90 tablet, Rfl: 1    Allergies:      Allergies   Allergen Reactions    Homatropine     Iodine     Penicillin G     Ranitidine Hives       Social History:     Social History Tobacco Use    Smoking status: Never Smoker    Smokeless tobacco: Never Used   Vaping Use    Vaping Use: Never used   Substance Use Topics    Alcohol use: Yes     Comment: occasionally    Drug use: Never       Patient lives at home. Physical Exam:     Vitals:    07/12/21 1109   BP: 124/80   Pulse: 74   Resp: 16   Temp: 97.3 °F (36.3 °C)   SpO2: 100%   Weight: 164 lb (74.4 kg)   Height: 5' 3\" (1.6 m)       Exam:  Physical Exam  Nurses note and vital signs reviewed and patient is not hypoxic. General: The patient appears well and in no apparent distress. Patient is resting comfortably on cart. Skin: Warm, dry, no pallor noted. The patient does have evidence of a maculopapular rash noted to the arms, to the neck area, and minimally to the waist area. The patient does not have any petechiae purpura. No sloughing of the skin. The rash does appear to jairon. Head: Normocephalic, atraumatic. Eye: Normal conjunctiva  Ears, Nose, Mouth, and Throat: Oral mucosa is moist  Cardiovascular: Regular Rate and Rhythm  Respiratory: Patient is in no distress, no accessory muscle use, lungs are clear to auscultation, no wheezing, crackles or rhonchi  Musculoskeletal: Rash as noted above, no evidence of obvious deformity noted to the upper or lower extremities, no evidence of gross cellulitis  Neurological: A&O x4, normal speech      Testing:           Medical Decision Making:     Vital signs reviewed    Past medical history reviewed. Allergies reviewed. Medications reviewed. Patient on arrival does not appear to be in any apparent distress or discomfort. The patient has been seen and evaluated. The patient does not appear to be toxic or lethargic. The patient will be treated with intramuscular injection of methylprednisone. The patient will also be given a tapering dose of prednisone.   The patient may continue using over-the-counter topical    The patient is to return to express care or go directly to the emergency department should any of the signs or symptoms worsen. The patient is to followup with primary care physician in 2-3 days for repeat evaluation. The patient has no other questions or concerns at this time the patient will be discharged home. Clinical Impression:   Shayla Padgett was seen today for rash. Diagnoses and all orders for this visit:    Rash and nonspecific skin eruption    Other orders  -     methylPREDNISolone acetate (DEPO-MEDROL) injection 60 mg  -     predniSONE (DELTASONE) 10 MG tablet; 3 tabs once daily for 3 days, 2 tabs once daily for 3 days, 1 tab once daily for 3 days        The patient is to call for any concerns or return if any of the signs or symptoms worsen. The patient is to follow-up with PCP in the next 2-3 days for repeat evaluation repeat assessment or go directly to the emergency department.      SIGNATURE: Bita Torrez III, PA-C

## 2021-07-26 ENCOUNTER — OFFICE VISIT (OUTPATIENT)
Dept: FAMILY MEDICINE CLINIC | Age: 46
End: 2021-07-26
Payer: COMMERCIAL

## 2021-07-26 VITALS
HEIGHT: 63 IN | BODY MASS INDEX: 28.84 KG/M2 | HEART RATE: 80 BPM | WEIGHT: 162.8 LBS | RESPIRATION RATE: 18 BRPM | DIASTOLIC BLOOD PRESSURE: 86 MMHG | SYSTOLIC BLOOD PRESSURE: 124 MMHG | OXYGEN SATURATION: 97 % | TEMPERATURE: 97.1 F

## 2021-07-26 DIAGNOSIS — L23.7 ALLERGIC CONTACT DERMATITIS DUE TO PLANTS, EXCEPT FOOD: Primary | ICD-10-CM

## 2021-07-26 PROCEDURE — 99213 OFFICE O/P EST LOW 20 MIN: CPT | Performed by: NURSE PRACTITIONER

## 2021-07-26 PROCEDURE — 96372 THER/PROPH/DIAG INJ SC/IM: CPT | Performed by: NURSE PRACTITIONER

## 2021-07-26 RX ORDER — TRIAMCINOLONE ACETONIDE 40 MG/ML
40 INJECTION, SUSPENSION INTRA-ARTICULAR; INTRAMUSCULAR ONCE
Status: COMPLETED | OUTPATIENT
Start: 2021-07-26 | End: 2021-07-26

## 2021-07-26 RX ORDER — PREDNISONE 10 MG/1
TABLET ORAL
Qty: 18 TABLET | Refills: 0 | Status: SHIPPED | OUTPATIENT
Start: 2021-07-26 | End: 2021-08-04

## 2021-07-26 RX ADMIN — TRIAMCINOLONE ACETONIDE 40 MG: 40 INJECTION, SUSPENSION INTRA-ARTICULAR; INTRAMUSCULAR at 15:17

## 2021-07-26 NOTE — PROGRESS NOTES
Chief Complaint:   Poison Ivy    History of Present Illness   Source of history provided by:  patientHayes Costello is a 55 y.o. old female who presents to the walk-in for evaluation of a rash to the bilateral arms, legs and left shoulder x 4 days. States the rash occurred shortly after working outdoors and suspects poison ivy exposure. Reports associated erythema, mild burning, and pruritis. Denies any bleeding or drainage. Denies any lymphangitic streaking, fever, chills, HA, dyspnea, dysphagia, recent illness, myalgias, vomiting, or lethargy. Review of Systems   Unless otherwise stated in this report or unable to obtain because of the patient's clinical or mental status as evidenced by the medical record, this patients's positive and negative responses for Review of Systems, constitutional, psych, eyes, ENT, cardiovascular, respiratory, gastrointestinal, neurological, genitourinary, musculoskeletal, integument systems and systems related to the presenting problem are either stated in the preceding or were not pertinent or were negative for the symptoms and/or complaints related to the medical problem. Past Medical History:  has a past medical history of Anxiety, Gastritis, Irritable bowel syndrome, and Panic attack. Past Surgical History:  has a past surgical history that includes Tubal ligation; Wrist surgery (Right); and Neck surgery (1996). Social History:  reports that she has never smoked. She has never used smokeless tobacco. She reports current alcohol use. She reports that she does not use drugs. Family History: family history is not on file.   Allergies: Homatropine, Iodine, Penicillin g, and Ranitidine    Physical Exam   Vital Signs:  /86   Pulse 80   Temp 97.1 °F (36.2 °C)   Resp 18   Ht 5' 3\" (1.6 m)   Wt 162 lb 12.8 oz (73.8 kg)   SpO2 97%   BMI 28.84 kg/m²    Oxygen Saturation Interpretation: Normal.    Constitutional:  Alert, development consistent with age.  HEENT:  NC/NT. Airway patent. Eyes:  PERRL, EOMI, no discharge. Lungs:  CTAB, no wheezing, rales, or rhonchi  Heart:  RRR without pathologic murmurs  Skin:  Normal turgor and appropriately dry to touch. Erythematous, maculopapular rash noted over the bilateral arms, legs and left shoulder. Scattered overlying vesicles and crusting noted. Signs of excoriation noted but no signs of secondary infection including TTP, pustules, induration, or fluctuance. No bleeding or discharge noted. No lymphangitic streaking. Neurological:  Orientation age-appropriate unless noted elseware. Motor functions intact. Test Results Section   (All laboratory and radiology results have been personally reviewed by myself)  Labs:  No results found for this visit on 07/26/21. Imaging: All Radiology results interpreted by Radiologist unless otherwise noted. No results found. Assessment / Plan   Impression(s):  Sapna was seen today for poison ivy. Diagnoses and all orders for this visit:    Allergic contact dermatitis due to plants, except food  -     triamcinolone (KENALOG) 0.1 % ointment; Apply topically 2 times daily as needed (itching)  -     triamcinolone acetonide (KENALOG-40) injection 40 mg  -     predniSONE (DELTASONE) 10 MG tablet; Take 3 tablets by mouth daily for 3 days, THEN 2 tablets daily for 3 days, THEN 1 tablet daily for 3 days. Kenalog injection administered, potential reactions discussed. Script written for Prednisone and Kenalog ointment, side effects discussed. Avoid scratching to prevent the development of a secondary infection. F/u PCP in 3-5 days if symptoms persist. ED sooner if symptoms worsen or change. ED immediately with any fever, dyspnea, dysphagia, CP, or signs of secondary infection which were discussed. Pt is in agreement with this care plan. All questions answered. Return if symptoms worsen or fail to improve.     Electronically signed by RYLAND Corbin CNP   DD:

## 2021-08-03 DIAGNOSIS — E78.2 MIXED HYPERLIPIDEMIA: ICD-10-CM

## 2021-08-03 LAB
ALBUMIN SERPL-MCNC: 4.2 G/DL (ref 3.5–5.2)
ALP BLD-CCNC: 65 U/L (ref 35–104)
ALT SERPL-CCNC: 16 U/L (ref 0–32)
ANION GAP SERPL CALCULATED.3IONS-SCNC: 10 MMOL/L (ref 7–16)
AST SERPL-CCNC: 21 U/L (ref 0–31)
BASOPHILS ABSOLUTE: 0.07 E9/L (ref 0–0.2)
BASOPHILS RELATIVE PERCENT: 0.9 % (ref 0–2)
BILIRUB SERPL-MCNC: 0.6 MG/DL (ref 0–1.2)
BUN BLDV-MCNC: 19 MG/DL (ref 6–20)
CALCIUM SERPL-MCNC: 9.2 MG/DL (ref 8.6–10.2)
CHLORIDE BLD-SCNC: 99 MMOL/L (ref 98–107)
CHOLESTEROL, TOTAL: 264 MG/DL (ref 0–199)
CO2: 28 MMOL/L (ref 22–29)
CREAT SERPL-MCNC: 1 MG/DL (ref 0.5–1)
EOSINOPHILS ABSOLUTE: 0.13 E9/L (ref 0.05–0.5)
EOSINOPHILS RELATIVE PERCENT: 1.8 % (ref 0–6)
GFR AFRICAN AMERICAN: >60
GFR NON-AFRICAN AMERICAN: 60 ML/MIN/1.73
GLUCOSE BLD-MCNC: 83 MG/DL (ref 74–99)
HCT VFR BLD CALC: 43.3 % (ref 34–48)
HDLC SERPL-MCNC: 89 MG/DL
HEMOGLOBIN: 13.7 G/DL (ref 11.5–15.5)
IMMATURE GRANULOCYTES #: 0.05 E9/L
IMMATURE GRANULOCYTES %: 0.7 % (ref 0–5)
LDL CHOLESTEROL CALCULATED: 151 MG/DL (ref 0–99)
LYMPHOCYTES ABSOLUTE: 1.71 E9/L (ref 1.5–4)
LYMPHOCYTES RELATIVE PERCENT: 23.1 % (ref 20–42)
MCH RBC QN AUTO: 30.3 PG (ref 26–35)
MCHC RBC AUTO-ENTMCNC: 31.6 % (ref 32–34.5)
MCV RBC AUTO: 95.8 FL (ref 80–99.9)
MONOCYTES ABSOLUTE: 0.67 E9/L (ref 0.1–0.95)
MONOCYTES RELATIVE PERCENT: 9.1 % (ref 2–12)
NEUTROPHILS ABSOLUTE: 4.76 E9/L (ref 1.8–7.3)
NEUTROPHILS RELATIVE PERCENT: 64.4 % (ref 43–80)
PDW BLD-RTO: 12.8 FL (ref 11.5–15)
PLATELET # BLD: 279 E9/L (ref 130–450)
PMV BLD AUTO: 10.5 FL (ref 7–12)
POTASSIUM SERPL-SCNC: 3.8 MMOL/L (ref 3.5–5)
RBC # BLD: 4.52 E12/L (ref 3.5–5.5)
SODIUM BLD-SCNC: 137 MMOL/L (ref 132–146)
TOTAL PROTEIN: 6.7 G/DL (ref 6.4–8.3)
TRIGL SERPL-MCNC: 122 MG/DL (ref 0–149)
VLDLC SERPL CALC-MCNC: 24 MG/DL
WBC # BLD: 7.4 E9/L (ref 4.5–11.5)

## 2021-08-19 ENCOUNTER — OFFICE VISIT (OUTPATIENT)
Dept: FAMILY MEDICINE CLINIC | Age: 46
End: 2021-08-19
Payer: COMMERCIAL

## 2021-08-19 VITALS
SYSTOLIC BLOOD PRESSURE: 120 MMHG | BODY MASS INDEX: 29.06 KG/M2 | OXYGEN SATURATION: 99 % | WEIGHT: 164 LBS | TEMPERATURE: 98.2 F | HEART RATE: 86 BPM | HEIGHT: 63 IN | DIASTOLIC BLOOD PRESSURE: 78 MMHG

## 2021-08-19 DIAGNOSIS — F41.9 ANXIETY: ICD-10-CM

## 2021-08-19 DIAGNOSIS — E07.9 THYROID DYSFUNCTION: ICD-10-CM

## 2021-08-19 DIAGNOSIS — N95.1 PERIMENOPAUSAL: ICD-10-CM

## 2021-08-19 DIAGNOSIS — Z12.11 SCREEN FOR COLON CANCER: ICD-10-CM

## 2021-08-19 DIAGNOSIS — Z12.31 ENCOUNTER FOR SCREENING MAMMOGRAM FOR BREAST CANCER: ICD-10-CM

## 2021-08-19 DIAGNOSIS — R00.2 PALPITATIONS: ICD-10-CM

## 2021-08-19 DIAGNOSIS — G47.33 OBSTRUCTIVE SLEEP APNEA SYNDROME: ICD-10-CM

## 2021-08-19 DIAGNOSIS — E78.2 MIXED HYPERLIPIDEMIA: Primary | ICD-10-CM

## 2021-08-19 DIAGNOSIS — K21.00 GASTROESOPHAGEAL REFLUX DISEASE WITH ESOPHAGITIS WITHOUT HEMORRHAGE: ICD-10-CM

## 2021-08-19 PROCEDURE — 99214 OFFICE O/P EST MOD 30 MIN: CPT | Performed by: FAMILY MEDICINE

## 2021-08-19 RX ORDER — ESCITALOPRAM OXALATE 20 MG/1
20 TABLET ORAL DAILY
Qty: 90 TABLET | Refills: 1 | Status: SHIPPED
Start: 2021-08-19 | End: 2022-02-08 | Stop reason: SDUPTHER

## 2021-08-19 RX ORDER — MAGNESIUM 200 MG
TABLET ORAL
COMMUNITY
End: 2022-08-02

## 2021-08-19 RX ORDER — ATORVASTATIN CALCIUM 20 MG/1
20 TABLET, FILM COATED ORAL DAILY
Qty: 90 TABLET | Refills: 1 | Status: SHIPPED
Start: 2021-08-19 | End: 2022-02-08 | Stop reason: SDUPTHER

## 2021-08-19 RX ORDER — PANTOPRAZOLE SODIUM 40 MG/1
40 TABLET, DELAYED RELEASE ORAL NIGHTLY
Qty: 90 TABLET | Refills: 1 | Status: SHIPPED
Start: 2021-08-19 | End: 2022-02-08 | Stop reason: SDUPTHER

## 2021-08-19 ASSESSMENT — ENCOUNTER SYMPTOMS
SHORTNESS OF BREATH: 0
DIARRHEA: 0
WHEEZING: 0
COUGH: 0
SINUS PAIN: 0
VOMITING: 0
BACK PAIN: 0
EYE PAIN: 0
CHEST TIGHTNESS: 0
ABDOMINAL PAIN: 0
TROUBLE SWALLOWING: 0
CONSTIPATION: 0
SORE THROAT: 0
NAUSEA: 0

## 2021-08-19 NOTE — PROGRESS NOTES
21    Name: Pily Kemp  :1975   Sex:female   Age:46 y.o. Chief Complaint   Patient presents with    Hyperlipidemia    Anxiety    Gastroesophageal Reflux    Rash    Sweats     Patient presents to office for visit. She did have labs done. Patient says she has been eating a lot of burgers this summer. Patient did have covid shots, but she does not have her card with her and she can not remember the dates. Patient says she moved to a new place in July and the property is covered with poison ivy. She has had poison ivy the entire summer. Patient has been through [facility] care twice for her poison ivy. The Kenalog helped her symptoms better than the Depo-Medrol, but she can not avoid the poison ivy, so the rash is pretty constant. Patient has been having an increase in hot flashes and night sweats. Every other period cycle is extremely painful and she will pass clots. Patient says she feels as if her moods are stable, but her sheets are usually drenched with sweat by morning time. She has a dental appliance for her sleep apnea. Patient says she must have layed wrong at night and pushed the metal bracket on her bottom lip. Her bottom left side of lip is numb. Patient did tell the specialist who prescribed the appliance and was advised to take high dose of sublingual B12. She has been taking two of her Protonix sometimes. She says this whole past week she has had to take 40 mg of Protonix to relieve her heartburn.      Here for check up    hyperlipdiemia  We discussed at length  Strong family history   Will try atorvastatin    Perimenopause  She is having some hot flashes  She really likes the lexapro so continue this  She has a gyn so lu discuss further treatment witheither of us if this worsens    Dermatitis  Poison ivy  Reviewed over the ocunter treatments  kenelog injection seemsed to work the best'    Has been on prednisone a few times in the last month asn tnoticed gerd is worse also just moved though so increased stress with that  Increase protonix to 40mg daily for now        Review of Systems   Constitutional: Negative for appetite change, fatigue and fever. HENT: Negative for congestion, ear pain, sinus pain, sore throat and trouble swallowing. Eyes: Negative for pain. Respiratory: Negative for cough, chest tightness, shortness of breath and wheezing. Cardiovascular: Negative for chest pain, palpitations and leg swelling. Gastrointestinal: Negative for abdominal pain, constipation, diarrhea, nausea and vomiting. Endocrine: Negative for cold intolerance and heat intolerance. Genitourinary: Negative for difficulty urinating, dysuria, frequency, hematuria, pelvic pain and urgency. Musculoskeletal: Negative for arthralgias, back pain, gait problem, joint swelling and myalgias. Skin: Positive for rash. Negative for wound. Neurological: Positive for numbness. Negative for dizziness, syncope, light-headedness and headaches. Hematological: Negative for adenopathy. Psychiatric/Behavioral: Negative for confusion, dysphoric mood, self-injury, sleep disturbance and suicidal ideas. The patient is not nervous/anxious.             Current Outpatient Medications:     Cyanocobalamin (B-12) 1000 MCG SUBL, Place under the tongue, Disp: , Rfl:     pantoprazole (PROTONIX) 40 MG tablet, Take 1 tablet by mouth nightly, Disp: 90 tablet, Rfl: 1    escitalopram (LEXAPRO) 20 MG tablet, Take 1 tablet by mouth daily, Disp: 90 tablet, Rfl: 1    atorvastatin (LIPITOR) 20 MG tablet, Take 1 tablet by mouth daily, Disp: 90 tablet, Rfl: 1  Allergies   Allergen Reactions    Homatropine     Iodine     Penicillin G     Ranitidine Hives      Past Medical History:   Diagnosis Date    Anxiety     Gastritis     Hemorrhoids     IBS (irritable bowel syndrome)     Irritable bowel syndrome     Panic attack     Sleep apnea     wears mouth guard, did not tolerate cpap     Patient Active Problem List Carlos Seth was seen today for hyperlipidemia, anxiety, gastroesophageal reflux, rash and sweats. Diagnoses and all orders for this visit:    Mixed hyperlipidemia  Comments:  strong family history of high cholesterol  agree to statin therpy  try atorvastatin 20mg nightly  Orders:  -     CBC Auto Differential; Future  -     Comprehensive Metabolic Panel; Future  -     Lipid Panel; Future    Anxiety  Comments:  stable with lexapro  feels much better  continue  meds  exercising occasionally helps too  Orders:  -     escitalopram (LEXAPRO) 20 MG tablet; Take 1 tablet by mouth daily    Gastroesophageal reflux disease with esophagitis without hemorrhage  Comments:  has been acting up with her rounds of prednisone and tress from moving  increase protonix to 40mg rightnow reassess in 6 mth    Palpitations  Comments:  much better since anxiety better  Orders:  -     escitalopram (LEXAPRO) 20 MG tablet; Take 1 tablet by mouth daily  -     TSH without Reflex; Future    Encounter for screening mammogram for breast cancer  Comments:  ordered for her  DMXI  Orders:  -     [facility] DIGITAL SCREEN W OR WO CAD BILATERAL; Future    Obstructive sleep apnea syndrome  Comments:  using mouth guard for this, did not tolerate cpap  sees DR Rahman in [address] [facility] for colon cancer  Comments:  had colonoscopy about 7 years ago, this was second one  [address]  will try to get copies    Perimenopausal  Comments:  having hot flashes  they do not keep her awake, still regular periods  back to seeing DR Barrios-GYN  no family hx breast ca    Thyroid dysfunction  -     TSH without Reflex; Future    Other orders  -     pantoprazole (PROTONIX) 40 MG tablet; Take 1 tablet by mouth nightly  -     atorvastatin (LIPITOR) 20 MG tablet; Take 1 tablet by mouth daily        I independently reviewed and updated the chief complaint, HPI, past medical and surgical history, medications, allergies and ROS as entered by the LPN.       Seen by:  Shelbi Witt, DO

## 2022-01-19 DIAGNOSIS — R00.2 PALPITATIONS: ICD-10-CM

## 2022-01-19 DIAGNOSIS — E07.9 THYROID DYSFUNCTION: ICD-10-CM

## 2022-01-19 DIAGNOSIS — E78.2 MIXED HYPERLIPIDEMIA: ICD-10-CM

## 2022-01-19 LAB
ALBUMIN SERPL-MCNC: 4.7 G/DL (ref 3.5–5.2)
ALP BLD-CCNC: 58 U/L (ref 35–104)
ALT SERPL-CCNC: 25 U/L (ref 0–32)
ANION GAP SERPL CALCULATED.3IONS-SCNC: 17 MMOL/L (ref 7–16)
AST SERPL-CCNC: 31 U/L (ref 0–31)
BASOPHILS ABSOLUTE: 0.09 E9/L (ref 0–0.2)
BASOPHILS RELATIVE PERCENT: 2 % (ref 0–2)
BILIRUB SERPL-MCNC: 0.8 MG/DL (ref 0–1.2)
BUN BLDV-MCNC: 13 MG/DL (ref 6–20)
CALCIUM SERPL-MCNC: 9.7 MG/DL (ref 8.6–10.2)
CHLORIDE BLD-SCNC: 100 MMOL/L (ref 98–107)
CHOLESTEROL, TOTAL: 205 MG/DL (ref 0–199)
CO2: 24 MMOL/L (ref 22–29)
CREAT SERPL-MCNC: 0.8 MG/DL (ref 0.5–1)
EOSINOPHILS ABSOLUTE: 0.16 E9/L (ref 0.05–0.5)
EOSINOPHILS RELATIVE PERCENT: 3.6 % (ref 0–6)
GFR AFRICAN AMERICAN: >60
GFR NON-AFRICAN AMERICAN: >60 ML/MIN/1.73
GLUCOSE BLD-MCNC: 99 MG/DL (ref 74–99)
HCT VFR BLD CALC: 43.7 % (ref 34–48)
HDLC SERPL-MCNC: 68 MG/DL
HEMOGLOBIN: 14.2 G/DL (ref 11.5–15.5)
IMMATURE GRANULOCYTES #: 0.02 E9/L
IMMATURE GRANULOCYTES %: 0.4 % (ref 0–5)
LDL CHOLESTEROL CALCULATED: 121 MG/DL (ref 0–99)
LYMPHOCYTES ABSOLUTE: 0.91 E9/L (ref 1.5–4)
LYMPHOCYTES RELATIVE PERCENT: 20.3 % (ref 20–42)
MCH RBC QN AUTO: 29.8 PG (ref 26–35)
MCHC RBC AUTO-ENTMCNC: 32.5 % (ref 32–34.5)
MCV RBC AUTO: 91.6 FL (ref 80–99.9)
MONOCYTES ABSOLUTE: 0.5 E9/L (ref 0.1–0.95)
MONOCYTES RELATIVE PERCENT: 11.2 % (ref 2–12)
NEUTROPHILS ABSOLUTE: 2.8 E9/L (ref 1.8–7.3)
NEUTROPHILS RELATIVE PERCENT: 62.5 % (ref 43–80)
PDW BLD-RTO: 11.9 FL (ref 11.5–15)
PLATELET # BLD: 278 E9/L (ref 130–450)
PMV BLD AUTO: 10.6 FL (ref 7–12)
POTASSIUM SERPL-SCNC: 4.1 MMOL/L (ref 3.5–5)
RBC # BLD: 4.77 E12/L (ref 3.5–5.5)
SODIUM BLD-SCNC: 141 MMOL/L (ref 132–146)
TOTAL PROTEIN: 7.4 G/DL (ref 6.4–8.3)
TRIGL SERPL-MCNC: 82 MG/DL (ref 0–149)
TSH SERPL DL<=0.05 MIU/L-ACNC: 3.08 UIU/ML (ref 0.27–4.2)
VLDLC SERPL CALC-MCNC: 16 MG/DL
WBC # BLD: 4.5 E9/L (ref 4.5–11.5)

## 2022-02-04 DIAGNOSIS — Z12.31 ENCOUNTER FOR SCREENING MAMMOGRAM FOR BREAST CANCER: ICD-10-CM

## 2022-02-08 ENCOUNTER — OFFICE VISIT (OUTPATIENT)
Dept: FAMILY MEDICINE CLINIC | Age: 47
End: 2022-02-08
Payer: COMMERCIAL

## 2022-02-08 VITALS
BODY MASS INDEX: 29.55 KG/M2 | DIASTOLIC BLOOD PRESSURE: 74 MMHG | TEMPERATURE: 97.9 F | HEART RATE: 80 BPM | HEIGHT: 63 IN | WEIGHT: 166.8 LBS | SYSTOLIC BLOOD PRESSURE: 122 MMHG | OXYGEN SATURATION: 98 %

## 2022-02-08 DIAGNOSIS — Z00.00 ENCOUNTER FOR WELL ADULT EXAM WITHOUT ABNORMAL FINDINGS: Primary | ICD-10-CM

## 2022-02-08 DIAGNOSIS — R00.2 PALPITATIONS: ICD-10-CM

## 2022-02-08 DIAGNOSIS — F41.9 ANXIETY: ICD-10-CM

## 2022-02-08 DIAGNOSIS — Z12.11 SCREEN FOR COLON CANCER: ICD-10-CM

## 2022-02-08 DIAGNOSIS — E78.2 MIXED HYPERLIPIDEMIA: ICD-10-CM

## 2022-02-08 DIAGNOSIS — L65.9 HAIR THINNING: ICD-10-CM

## 2022-02-08 DIAGNOSIS — E07.9 THYROID DYSFUNCTION: ICD-10-CM

## 2022-02-08 DIAGNOSIS — K21.9 GASTROESOPHAGEAL REFLUX DISEASE WITHOUT ESOPHAGITIS: ICD-10-CM

## 2022-02-08 PROCEDURE — 99396 PREV VISIT EST AGE 40-64: CPT | Performed by: FAMILY MEDICINE

## 2022-02-08 RX ORDER — PANTOPRAZOLE SODIUM 40 MG/1
40 TABLET, DELAYED RELEASE ORAL NIGHTLY
Qty: 90 TABLET | Refills: 1 | Status: SHIPPED
Start: 2022-02-08 | End: 2022-08-02 | Stop reason: SDUPTHER

## 2022-02-08 RX ORDER — ESCITALOPRAM OXALATE 10 MG/1
15 TABLET ORAL DAILY
Qty: 135 TABLET | Refills: 1 | Status: SHIPPED
Start: 2022-02-08 | End: 2022-08-02 | Stop reason: SDUPTHER

## 2022-02-08 RX ORDER — ATORVASTATIN CALCIUM 20 MG/1
20 TABLET, FILM COATED ORAL DAILY
Qty: 90 TABLET | Refills: 1 | Status: SHIPPED
Start: 2022-02-08 | End: 2022-08-02 | Stop reason: SDUPTHER

## 2022-02-08 ASSESSMENT — PATIENT HEALTH QUESTIONNAIRE - PHQ9
SUM OF ALL RESPONSES TO PHQ QUESTIONS 1-9: 0
2. FEELING DOWN, DEPRESSED OR HOPELESS: 0
SUM OF ALL RESPONSES TO PHQ QUESTIONS 1-9: 0
SUM OF ALL RESPONSES TO PHQ QUESTIONS 1-9: 0
1. LITTLE INTEREST OR PLEASURE IN DOING THINGS: 0
SUM OF ALL RESPONSES TO PHQ QUESTIONS 1-9: 0
SUM OF ALL RESPONSES TO PHQ9 QUESTIONS 1 & 2: 0

## 2022-02-08 ASSESSMENT — ENCOUNTER SYMPTOMS
BACK PAIN: 0
ABDOMINAL PAIN: 0
SHORTNESS OF BREATH: 0
WHEEZING: 0
EYE PAIN: 0
DIARRHEA: 0
CHEST TIGHTNESS: 0
TROUBLE SWALLOWING: 0
SINUS PAIN: 0
NAUSEA: 0
SORE THROAT: 0
CONSTIPATION: 0
COUGH: 0
VOMITING: 0

## 2022-02-08 NOTE — PROGRESS NOTES
22    Name: Roman Beck  :1975   Sex:female   Age:47 y.o. Chief Complaint   Patient presents with    Hyperlipidemia    Gastroesophageal Reflux    Hair/Scalp Problem     Patient presents to office for visit. She did have labs done. Patient is taking atorvastatin everyday. Patient's main concern is hair loss. She continues to have a large amount of her hair fall out over the past year. Patient here for a check up    Continues to have issues with hair loss'  We reviewed some possible cuases  [place] menopause possibility but no other s/s  Low protein diet  She thinks she had covid in 2020 before all the testing was available and that is possible  Reviewed supplements, minoxidil    Anxiety has been stable  She  Is in a new position with work and it is less stressful  Would recommend decreasing lexapro b/c that can certainly be a cause for the hair thinning  She agreed but start with just a decrease to 15mg    Cholesterol getttin much better'  Continue atorvastatin    gerd stable  Feeling great with the protonix  Also continues to watch diet        Review of Systems   Constitutional: Negative for appetite change, fatigue and fever. HENT: Negative for congestion, ear pain, sinus pain, sore throat and trouble swallowing. Eyes: Negative for pain. Respiratory: Negative for cough, chest tightness, shortness of breath and wheezing. Cardiovascular: Negative for chest pain, palpitations and leg swelling. Gastrointestinal: Negative for abdominal pain, constipation, diarrhea, nausea and vomiting. Endocrine: Negative for cold intolerance and heat intolerance. Genitourinary: Negative for difficulty urinating, dysuria, frequency, hematuria, pelvic pain and urgency. Musculoskeletal: Negative for arthralgias, back pain, gait problem, joint swelling and myalgias. Skin: Negative for rash and wound. Neurological: Negative for dizziness, syncope, light-headedness and headaches. Hematological: Negative for adenopathy. Psychiatric/Behavioral: Negative for confusion, dysphoric mood, self-injury, sleep disturbance and suicidal ideas. The patient is not nervous/anxious. Current Outpatient Medications:     pantoprazole (PROTONIX) 40 MG tablet, Take 1 tablet by mouth nightly, Disp: 90 tablet, Rfl: 1    escitalopram (LEXAPRO) 10 MG tablet, Take 1.5 tablets by mouth daily, Disp: 135 tablet, Rfl: 1    atorvastatin (LIPITOR) 20 MG tablet, Take 1 tablet by mouth daily, Disp: 90 tablet, Rfl: 1    Cyanocobalamin (B-12) 1000 MCG SUBL, Place under the tongue, Disp: , Rfl:   Allergies   Allergen Reactions    Homatropine     Iodine     Penicillin G     Ranitidine Hives      Past Medical History:   Diagnosis Date    Anxiety     Gastritis     Hemorrhoids     IBS (irritable bowel syndrome)     Irritable bowel syndrome     Panic attack     Sleep apnea     wears mouth guard, did not tolerate cpap     Patient Active Problem List    Diagnosis Date Noted    Perimenopausal 08/19/2021    Gastroesophageal reflux disease with esophagitis without hemorrhage 08/19/2021    Sleep apnea     Mixed hyperlipidemia 02/23/2021    Gastritis     Anxiety 09/04/2019      Past Surgical History:   Procedure Laterality Date    NECK SURGERY  1996    lipoma removal    TUBAL LIGATION      WRIST SURGERY Right     ganglion cyst      Social History     Tobacco History     Smoking Status  Never Smoker    Smokeless Tobacco Use  Never Used          Alcohol History     Alcohol Use Status  Yes Comment  occasionally          Drug Use     Drug Use Status  Never          Sexual Activity     Sexually Active  Yes Partners  Male Comment  -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    Constitutional:       General: She is not in acute distress. Appearance: She is well-developed. She is not ill-appearing. HENT:      Head: Normocephalic and atraumatic. Right Ear: Tympanic membrane normal.      Left Ear: Tympanic membrane normal.      Nose: Nose normal.      Mouth/Throat:      Mouth: Mucous membranes are moist.   Eyes:      Pupils: Pupils are equal, round, and reactive to light. Cardiovascular:      Rate and Rhythm: Normal rate and regular rhythm. Pulmonary:      Effort: Pulmonary effort is normal.      Breath sounds: Normal breath sounds. Abdominal:      General: Bowel sounds are normal.      Palpations: Abdomen is soft. Musculoskeletal:      Cervical back: Normal range of motion. Comments: Gait steady in the office today   Skin:     General: Skin is warm and dry. Neurological:      Mental Status: She is alert and oriented to person, place, and time. Mental status is at baseline. Psychiatric:         Mood and Affect: Mood normal.         Thought Content: Thought content normal.          Deni Shen was seen today for hyperlipidemia, gastroesophageal reflux and hair/scalp problem. Diagnoses and all orders for this visit:    Encounter for well adult exam without abnormal findings  -     Fecal DNA Colorectal cancer screening (Cologuard)  -     CBC Auto Differential; Future  -     Comprehensive Metabolic Panel; Future  -     Lipid Panel; Future    Anxiety  Comments:  stable with lexapro  feels much better  continue  meds  exercising occasionally helps too  Orders:  -     escitalopram (LEXAPRO) 10 MG tablet; Take 1.5 tablets by mouth daily    Palpitations  Comments:  much better since anxiety better  Orders:  -     escitalopram (LEXAPRO) 10 MG tablet; Take 1.5 tablets by mouth daily    Hair thinning  Comments:  biotin, more protein in diet  decrease lexapro dose-this is possibly a cause  she is feeling great with it though  Orders:  -     T4, Free; Future  -     TSH without Reflex;  Future  -     THYROID PEROXIDASE ANTIBODY; Future    Mixed hyperlipidemia  Comments:  getting better with atorvastatin  continue same dose    Gastroesophageal reflux disease without esophagitis  Comments:  greatly improved with protonix  she feels great    Screen for colon cancer  -     Fecal DNA Colorectal cancer screening (Cologuard)    Thyroid dysfunction  -     T4, Free; Future  -     TSH without Reflex; Future  -     THYROID PEROXIDASE ANTIBODY; Future    Other orders  -     pantoprazole (PROTONIX) 40 MG tablet; Take 1 tablet by mouth nightly  -     atorvastatin (LIPITOR) 20 MG tablet; Take 1 tablet by mouth daily        I independently reviewed and updated the chief complaint, HPI, past medical and surgical history, medications, allergies and ROS as entered by the LPN. Seen by:   Jenn Hernadez DO

## 2022-03-04 LAB — NONINV COLON CA DNA+OCC BLD SCRN STL QL: NEGATIVE

## 2022-04-25 ENCOUNTER — OFFICE VISIT (OUTPATIENT)
Dept: FAMILY MEDICINE CLINIC | Age: 47
End: 2022-04-25
Payer: COMMERCIAL

## 2022-04-25 VITALS
TEMPERATURE: 96.8 F | HEIGHT: 63 IN | BODY MASS INDEX: 27.82 KG/M2 | WEIGHT: 157 LBS | RESPIRATION RATE: 18 BRPM | OXYGEN SATURATION: 100 % | SYSTOLIC BLOOD PRESSURE: 116 MMHG | DIASTOLIC BLOOD PRESSURE: 72 MMHG | HEART RATE: 72 BPM

## 2022-04-25 DIAGNOSIS — T63.451A HORNET STING, ACCIDENTAL OR UNINTENTIONAL, INITIAL ENCOUNTER: Primary | ICD-10-CM

## 2022-04-25 PROCEDURE — 99213 OFFICE O/P EST LOW 20 MIN: CPT | Performed by: FAMILY MEDICINE

## 2022-04-25 RX ORDER — METHYLPREDNISOLONE 4 MG/1
TABLET ORAL
Qty: 1 KIT | Refills: 0 | Status: SHIPPED | OUTPATIENT
Start: 2022-04-25 | End: 2022-05-01

## 2022-05-16 ENCOUNTER — TELEPHONE (OUTPATIENT)
Dept: FAMILY MEDICINE CLINIC | Age: 47
End: 2022-05-16

## 2022-05-16 NOTE — TELEPHONE ENCOUNTER
Was on a cruise. Onset of symptoms Friday. Positive home COVID this morning. Sore throat, sinus drainage, coughing, allergy symptoms, headache. Has had both Moderna + booster.

## 2022-05-16 NOTE — TELEPHONE ENCOUNTER
----- Message from Natalia Brunson sent at 5/16/2022  1:41 PM EDT -----  Subject: Message to Provider    QUESTIONS  Information for Provider? PT is requesting to speak to the office in   regards to the medication now available for treating covid. She is   currently covid positive. Please follow up with the PT.   ---------------------------------------------------------------------------  --------------  CALL BACK INFO  What is the best way for the office to contact you? OK to leave message on   voicemail  Preferred Call Back Phone Number? [phone]  ---------------------------------------------------------------------------  --------------  SCRIPT ANSWERS  Relationship to Patient?  Self

## 2022-05-16 NOTE — TELEPHONE ENCOUNTER
Patient states she will try the OTC first and see how she feels. She will call office if she feels she needs seen or is getting worse.

## 2022-05-16 NOTE — TELEPHONE ENCOUNTER
Her symptoms should be minor due to having the vaccines  She can take over the counter meds, claritin or zyrtec and flonase  Also mucinex can help  We can do some prednisone if she is having a lot of congestion also needs to stay hydrated    You can schedule her to get checked if she feels she needs seen, I have some openings left this week

## 2022-06-23 ENCOUNTER — HOSPITAL ENCOUNTER (OUTPATIENT)
Age: 47
Discharge: HOME | End: 2022-06-25

## 2022-06-23 PROCEDURE — 88305 TISSUE EXAM BY PATHOLOGIST: CPT

## 2022-07-26 DIAGNOSIS — L65.9 HAIR THINNING: ICD-10-CM

## 2022-07-26 DIAGNOSIS — Z00.00 ENCOUNTER FOR WELL ADULT EXAM WITHOUT ABNORMAL FINDINGS: ICD-10-CM

## 2022-07-26 DIAGNOSIS — E07.9 THYROID DYSFUNCTION: ICD-10-CM

## 2022-07-26 LAB
ALBUMIN SERPL-MCNC: 4.4 G/DL (ref 3.5–5.2)
ALP BLD-CCNC: 64 U/L (ref 35–104)
ALT SERPL-CCNC: 11 U/L (ref 0–32)
ANION GAP SERPL CALCULATED.3IONS-SCNC: 14 MMOL/L (ref 7–16)
AST SERPL-CCNC: 21 U/L (ref 0–31)
BASOPHILS ABSOLUTE: 0.08 E9/L (ref 0–0.2)
BASOPHILS RELATIVE PERCENT: 1.9 % (ref 0–2)
BILIRUB SERPL-MCNC: 0.4 MG/DL (ref 0–1.2)
BUN BLDV-MCNC: 11 MG/DL (ref 6–20)
CALCIUM SERPL-MCNC: 9.2 MG/DL (ref 8.6–10.2)
CHLORIDE BLD-SCNC: 106 MMOL/L (ref 98–107)
CHOLESTEROL, TOTAL: 190 MG/DL (ref 0–199)
CO2: 21 MMOL/L (ref 22–29)
CREAT SERPL-MCNC: 0.9 MG/DL (ref 0.5–1)
EOSINOPHILS ABSOLUTE: 0.21 E9/L (ref 0.05–0.5)
EOSINOPHILS RELATIVE PERCENT: 5 % (ref 0–6)
GFR AFRICAN AMERICAN: >60
GFR NON-AFRICAN AMERICAN: >60 ML/MIN/1.73
GLUCOSE BLD-MCNC: 100 MG/DL (ref 74–99)
HCT VFR BLD CALC: 40.3 % (ref 34–48)
HDLC SERPL-MCNC: 73 MG/DL
HEMOGLOBIN: 12.9 G/DL (ref 11.5–15.5)
IMMATURE GRANULOCYTES #: 0.02 E9/L
IMMATURE GRANULOCYTES %: 0.5 % (ref 0–5)
LDL CHOLESTEROL CALCULATED: 97 MG/DL (ref 0–99)
LYMPHOCYTES ABSOLUTE: 0.81 E9/L (ref 1.5–4)
LYMPHOCYTES RELATIVE PERCENT: 19.4 % (ref 20–42)
MCH RBC QN AUTO: 29.7 PG (ref 26–35)
MCHC RBC AUTO-ENTMCNC: 32 % (ref 32–34.5)
MCV RBC AUTO: 92.9 FL (ref 80–99.9)
MONOCYTES ABSOLUTE: 0.4 E9/L (ref 0.1–0.95)
MONOCYTES RELATIVE PERCENT: 9.6 % (ref 2–12)
NEUTROPHILS ABSOLUTE: 2.65 E9/L (ref 1.8–7.3)
NEUTROPHILS RELATIVE PERCENT: 63.6 % (ref 43–80)
PDW BLD-RTO: 12.2 FL (ref 11.5–15)
PLATELET # BLD: 284 E9/L (ref 130–450)
PMV BLD AUTO: 10.4 FL (ref 7–12)
POTASSIUM SERPL-SCNC: 4.3 MMOL/L (ref 3.5–5)
RBC # BLD: 4.34 E12/L (ref 3.5–5.5)
SODIUM BLD-SCNC: 141 MMOL/L (ref 132–146)
T4 FREE: 1.02 NG/DL (ref 0.93–1.7)
TOTAL PROTEIN: 7.3 G/DL (ref 6.4–8.3)
TRIGL SERPL-MCNC: 100 MG/DL (ref 0–149)
TSH SERPL DL<=0.05 MIU/L-ACNC: 2.7 UIU/ML (ref 0.27–4.2)
VLDLC SERPL CALC-MCNC: 20 MG/DL
WBC # BLD: 4.2 E9/L (ref 4.5–11.5)

## 2022-07-29 LAB — THYROID PEROXIDASE (TPO) ABS: 4.3 IU/ML (ref 0–25)

## 2022-08-02 ENCOUNTER — OFFICE VISIT (OUTPATIENT)
Dept: FAMILY MEDICINE CLINIC | Age: 47
End: 2022-08-02
Payer: COMMERCIAL

## 2022-08-02 VITALS
DIASTOLIC BLOOD PRESSURE: 66 MMHG | TEMPERATURE: 98.3 F | HEART RATE: 76 BPM | BODY MASS INDEX: 27.43 KG/M2 | HEIGHT: 63 IN | WEIGHT: 154.8 LBS | SYSTOLIC BLOOD PRESSURE: 122 MMHG | OXYGEN SATURATION: 97 %

## 2022-08-02 DIAGNOSIS — K59.01 SLOW TRANSIT CONSTIPATION: ICD-10-CM

## 2022-08-02 DIAGNOSIS — F41.9 ANXIETY: Primary | ICD-10-CM

## 2022-08-02 DIAGNOSIS — K21.00 GASTROESOPHAGEAL REFLUX DISEASE WITH ESOPHAGITIS WITHOUT HEMORRHAGE: ICD-10-CM

## 2022-08-02 DIAGNOSIS — E78.2 MIXED HYPERLIPIDEMIA: ICD-10-CM

## 2022-08-02 DIAGNOSIS — R00.2 PALPITATIONS: ICD-10-CM

## 2022-08-02 PROCEDURE — 99214 OFFICE O/P EST MOD 30 MIN: CPT | Performed by: FAMILY MEDICINE

## 2022-08-02 RX ORDER — ATORVASTATIN CALCIUM 20 MG/1
20 TABLET, FILM COATED ORAL DAILY
Qty: 90 TABLET | Refills: 1 | Status: SHIPPED | OUTPATIENT
Start: 2022-08-02

## 2022-08-02 RX ORDER — PANTOPRAZOLE SODIUM 40 MG/1
40 TABLET, DELAYED RELEASE ORAL NIGHTLY
Qty: 90 TABLET | Refills: 1 | Status: SHIPPED | OUTPATIENT
Start: 2022-08-02

## 2022-08-02 RX ORDER — ESCITALOPRAM OXALATE 10 MG/1
10 TABLET ORAL DAILY
Qty: 90 TABLET | Refills: 1 | Status: SHIPPED | OUTPATIENT
Start: 2022-08-02

## 2022-08-02 ASSESSMENT — ENCOUNTER SYMPTOMS
EYE PAIN: 0
COUGH: 0
DIARRHEA: 0
CONSTIPATION: 1
ABDOMINAL PAIN: 0
SORE THROAT: 0
WHEEZING: 0
NAUSEA: 0
TROUBLE SWALLOWING: 0
VOMITING: 0
SINUS PAIN: 0
CHEST TIGHTNESS: 0
BACK PAIN: 0
ABDOMINAL DISTENTION: 1
SHORTNESS OF BREATH: 0

## 2022-08-02 NOTE — PROGRESS NOTES
22    Name: Melinda Powers  :1975   Sex:female   Age:47 y.o. Chief Complaint   Patient presents with    Hyperlipidemia    Gastroesophageal Reflux    Anxiety    Constipation     Patient presents to office for visit. She is now taking Lexapro 10 mg daily. Patient started with lower dose of Lexapro two days ago. Patient is taking Atorvastatin every day. She had surgery in  and was taking pain medication for two days which caused constipation. Patient started on stool softener. She says since that surgery her bowel movements have not been right. She is having constipation and abdominal bloat. Patient started on probiotic, prebiotic, and digestive enzyme recently to see if this would help. So far, she has not had improvement in her symptoms.      Here for a check up  Doing well    She had breast reduction in   Took percocet for pain for like 3 days  Since then has been having issues with constipation  Just cannot get her self back on a regular schedule with bowel habits  Encouraged more water  Make sure she is getting enough fiber, more fruits and veggies, citrucel if necessary  Also could do miralax may help bettter then just a stool softener  She has started a pro and pre biotic so this mighthelp, it has only been a few days, so replacing the normal bacteria in the gut might make a big difference  If still haivng issues n a amonth she will let us know    Stress and anxeity much better  Back down to 10mg lexapro  Her job really seems to be the cause of the increased stress  She has since switched and doing much better againalso started running again and this hashelped    Reflux stable with protonix  Watching diet  Gastritis much better with the protonix may have also been stress related  Will eventually try to get the protonix down to prn status    Lipids  Reviewed labs with her at length  LDL is coming down and she is tolerating the statin great  No changes    Review of Systems   Constitutional: Negative for appetite change, fatigue and fever. HENT:  Negative for congestion, ear pain, sinus pain, sore throat and trouble swallowing. Eyes:  Negative for pain. Respiratory:  Negative for cough, chest tightness, shortness of breath and wheezing. Cardiovascular:  Negative for chest pain, palpitations and leg swelling. Gastrointestinal:  Positive for abdominal distention and constipation. Negative for abdominal pain, diarrhea, nausea and vomiting. Endocrine: Negative for cold intolerance and heat intolerance. Genitourinary:  Negative for difficulty urinating, dysuria, frequency, hematuria and pelvic pain. Musculoskeletal:  Negative for arthralgias, back pain, gait problem, joint swelling and myalgias. Skin:  Negative for rash and wound. Neurological:  Negative for dizziness, syncope and headaches. Hematological:  Negative for adenopathy. Psychiatric/Behavioral:  Negative for confusion, dysphoric mood, self-injury, sleep disturbance and suicidal ideas. The patient is not nervous/anxious.           Current Outpatient Medications:     atorvastatin (LIPITOR) 20 MG tablet, Take 1 tablet by mouth in the morning., Disp: 90 tablet, Rfl: 1    escitalopram (LEXAPRO) 10 MG tablet, Take 1 tablet by mouth in the morning., Disp: 90 tablet, Rfl: 1    pantoprazole (PROTONIX) 40 MG tablet, Take 1 tablet by mouth nightly, Disp: 90 tablet, Rfl: 1    Cyanocobalamin (VITAMIN B-12 PO), Take by mouth, Disp: , Rfl:     Digestive Enzymes (DIGESTIVE ENZYME PO), Take by mouth, Disp: , Rfl:     Probiotic Product (PROBIOTIC DAILY PO), Take by mouth, Disp: , Rfl:     PREBIOTIC PRODUCT PO, Take by mouth, Disp: , Rfl:   Allergies   Allergen Reactions    Homatropine     Iodine     Penicillin G     Ranitidine Hives    Hydrocodone Rash      Past Medical History:   Diagnosis Date    Anxiety     Gastritis     Hemorrhoids     IBS (irritable bowel syndrome)     Irritable bowel syndrome     Panic attack     Sleep apnea     wears mouth guard, did not tolerate cpap     Patient Active Problem List    Diagnosis Date Noted    Perimenopausal 08/19/2021    Gastroesophageal reflux disease with esophagitis without hemorrhage 08/19/2021    Sleep apnea     Mixed hyperlipidemia 02/23/2021    Gastritis     Anxiety 09/04/2019      Past Surgical History:   Procedure Laterality Date    NECK SURGERY  1996    lipoma removal    TUBAL LIGATION      WRIST SURGERY Right     ganglion cyst      Social History       Tobacco History       Smoking Status  Never      Smokeless Tobacco Use  Never              Alcohol History       Alcohol Use Status  Yes Comment  occasionally              Drug Use       Drug Use Status  Never              Sexual Activity       Sexually Active  Yes Partners  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 Constitutional:       General: She is not in acute distress. Appearance: She is well-developed. She is not ill-appearing. HENT:      Head: Normocephalic and atraumatic. Right Ear: Tympanic membrane normal.      Left Ear: Tympanic membrane normal.      Nose: Nose normal.   Eyes:      Pupils: Pupils are equal, round, and reactive to light. Cardiovascular:      Rate and Rhythm: Normal rate and regular rhythm. Pulmonary:      Effort: Pulmonary effort is normal.      Breath sounds: Normal breath sounds. Abdominal:      General: Bowel sounds are normal.      Palpations: Abdomen is soft. Musculoskeletal:      Cervical back: Normal range of motion. Comments: Gait steady   Skin:     General: Skin is warm and dry. Neurological:      Mental Status: She is alert and oriented to person, place, and time. Mental status is at baseline.    Psychiatric:         Mood and Affect: Mood normal.         Thought Content: Thought content normal.        Nora Baxter was seen today for hyperlipidemia, gastroesophageal reflux, anxiety and constipation. Diagnoses and all orders for this visit:    Anxiety  Comments:  stable with lexapro  feels much better  decrease to 10mg doing great  Orders:  -     escitalopram (LEXAPRO) 10 MG tablet; Take 1 tablet by mouth in the morning. Palpitations  Comments:  much better since anxiety better  resolved, no issues in last few months  Orders:  -     escitalopram (LEXAPRO) 10 MG tablet; Take 1 tablet by mouth in the morning. Mixed hyperlipidemia  Comments:  much improved with statin and losing weight  she is doing great  watching diet and eating healthier  Orders:  -     atorvastatin (LIPITOR) 20 MG tablet; Take 1 tablet by mouth in the morning. Gastroesophageal reflux disease with esophagitis without hemorrhage  Comments:  has been stable  on protonix  will try to get it to prn status soon  Orders:  -     pantoprazole (PROTONIX) 40 MG tablet; Take 1 tablet by mouth nightly    Slow transit constipation  Comments:  started after sx  increase water, more fruits and veggies/prunes  miralax as needed      I independently reviewed and updated the chief complaint, HPI, past medical and surgical history, medications, allergies and ROS as entered by the LPN. Seen by:   Jose Brar DO

## 2023-01-20 ENCOUNTER — TELEPHONE (OUTPATIENT)
Dept: FAMILY MEDICINE CLINIC | Age: 48
End: 2023-01-20

## 2023-01-20 NOTE — TELEPHONE ENCOUNTER
----- Message from Grace Colin sent at 1/20/2023 11:11 AM EST -----  Subject: Message to Provider    QUESTIONS  Information for Provider? Patient would like to change appt on 2/2/23 to a   telehealth appt.   ---------------------------------------------------------------------------  --------------  [id number] [facility]  [phone]; OK to leave message on voicemail  ---------------------------------------------------------------------------  --------------  SCRIPT ANSWERS  Relationship to Patient?  Self

## 2023-01-23 NOTE — TELEPHONE ENCOUNTER
She can change it but then it cannot be coded as a well visit  Only in person visits can be coded as their yearly visit  So its up to her

## 2023-02-14 DIAGNOSIS — E78.2 MIXED HYPERLIPIDEMIA: ICD-10-CM

## 2023-02-14 RX ORDER — ATORVASTATIN CALCIUM 20 MG/1
TABLET, FILM COATED ORAL
Qty: 90 TABLET | Refills: 0 | OUTPATIENT
Start: 2023-02-14

## 2023-02-21 ENCOUNTER — OFFICE VISIT (OUTPATIENT)
Dept: FAMILY MEDICINE CLINIC | Age: 48
End: 2023-02-21
Payer: COMMERCIAL

## 2023-02-21 VITALS
SYSTOLIC BLOOD PRESSURE: 130 MMHG | DIASTOLIC BLOOD PRESSURE: 80 MMHG | WEIGHT: 138 LBS | BODY MASS INDEX: 24.45 KG/M2 | HEIGHT: 63 IN | OXYGEN SATURATION: 98 % | TEMPERATURE: 98 F | HEART RATE: 120 BPM

## 2023-02-21 DIAGNOSIS — K21.00 GASTROESOPHAGEAL REFLUX DISEASE WITH ESOPHAGITIS WITHOUT HEMORRHAGE: ICD-10-CM

## 2023-02-21 DIAGNOSIS — F41.9 ANXIETY: ICD-10-CM

## 2023-02-21 DIAGNOSIS — F32.1 CURRENT MODERATE EPISODE OF MAJOR DEPRESSIVE DISORDER WITHOUT PRIOR EPISODE (HCC): ICD-10-CM

## 2023-02-21 DIAGNOSIS — R00.2 PALPITATIONS: ICD-10-CM

## 2023-02-21 DIAGNOSIS — Z00.00 ENCOUNTER FOR WELL ADULT EXAM WITHOUT ABNORMAL FINDINGS: Primary | ICD-10-CM

## 2023-02-21 DIAGNOSIS — E78.2 MIXED HYPERLIPIDEMIA: ICD-10-CM

## 2023-02-21 LAB
ALBUMIN SERPL-MCNC: 4.5 G/DL (ref 3.5–5.2)
ALP BLD-CCNC: 48 U/L (ref 35–104)
ALT SERPL-CCNC: 19 U/L (ref 0–32)
ANION GAP SERPL CALCULATED.3IONS-SCNC: 14 MMOL/L (ref 7–16)
AST SERPL-CCNC: 23 U/L (ref 0–31)
BASOPHILS ABSOLUTE: 0.05 E9/L (ref 0–0.2)
BASOPHILS RELATIVE PERCENT: 1.2 % (ref 0–2)
BILIRUB SERPL-MCNC: 0.8 MG/DL (ref 0–1.2)
BUN BLDV-MCNC: 10 MG/DL (ref 6–20)
CALCIUM SERPL-MCNC: 9.8 MG/DL (ref 8.6–10.2)
CHLORIDE BLD-SCNC: 105 MMOL/L (ref 98–107)
CHOLESTEROL, TOTAL: 177 MG/DL (ref 0–199)
CO2: 23 MMOL/L (ref 22–29)
CREAT SERPL-MCNC: 0.9 MG/DL (ref 0.5–1)
EOSINOPHILS ABSOLUTE: 0.1 E9/L (ref 0.05–0.5)
EOSINOPHILS RELATIVE PERCENT: 2.4 % (ref 0–6)
GFR SERPL CREATININE-BSD FRML MDRD: >60 ML/MIN/1.73
GLUCOSE BLD-MCNC: 93 MG/DL (ref 74–99)
HCT VFR BLD CALC: 40.4 % (ref 34–48)
HDLC SERPL-MCNC: 63 MG/DL
HEMOGLOBIN: 13.6 G/DL (ref 11.5–15.5)
IMMATURE GRANULOCYTES #: 0.01 E9/L
IMMATURE GRANULOCYTES %: 0.2 % (ref 0–5)
LDL CHOLESTEROL CALCULATED: 99 MG/DL (ref 0–99)
LYMPHOCYTES ABSOLUTE: 0.73 E9/L (ref 1.5–4)
LYMPHOCYTES RELATIVE PERCENT: 17.6 % (ref 20–42)
MCH RBC QN AUTO: 30.2 PG (ref 26–35)
MCHC RBC AUTO-ENTMCNC: 33.7 % (ref 32–34.5)
MCV RBC AUTO: 89.6 FL (ref 80–99.9)
MONOCYTES ABSOLUTE: 0.39 E9/L (ref 0.1–0.95)
MONOCYTES RELATIVE PERCENT: 9.4 % (ref 2–12)
NEUTROPHILS ABSOLUTE: 2.86 E9/L (ref 1.8–7.3)
NEUTROPHILS RELATIVE PERCENT: 69.2 % (ref 43–80)
PDW BLD-RTO: 12.5 FL (ref 11.5–15)
PLATELET # BLD: 255 E9/L (ref 130–450)
PMV BLD AUTO: 11 FL (ref 7–12)
POTASSIUM SERPL-SCNC: 4.6 MMOL/L (ref 3.5–5)
RBC # BLD: 4.51 E12/L (ref 3.5–5.5)
SODIUM BLD-SCNC: 142 MMOL/L (ref 132–146)
TOTAL PROTEIN: 7.3 G/DL (ref 6.4–8.3)
TRIGL SERPL-MCNC: 75 MG/DL (ref 0–149)
VLDLC SERPL CALC-MCNC: 15 MG/DL
WBC # BLD: 4.1 E9/L (ref 4.5–11.5)

## 2023-02-21 PROCEDURE — 99396 PREV VISIT EST AGE 40-64: CPT | Performed by: FAMILY MEDICINE

## 2023-02-21 RX ORDER — ATORVASTATIN CALCIUM 20 MG/1
20 TABLET, FILM COATED ORAL DAILY
Qty: 90 TABLET | Refills: 1 | Status: SHIPPED | OUTPATIENT
Start: 2023-02-21

## 2023-02-21 RX ORDER — PANTOPRAZOLE SODIUM 40 MG/1
40 TABLET, DELAYED RELEASE ORAL NIGHTLY
Qty: 90 TABLET | Refills: 1 | Status: SHIPPED | OUTPATIENT
Start: 2023-02-21

## 2023-02-21 RX ORDER — BUPROPION HYDROCHLORIDE 150 MG/1
150 TABLET ORAL EVERY MORNING
Qty: 90 TABLET | Refills: 0 | Status: SHIPPED | OUTPATIENT
Start: 2023-02-21

## 2023-02-21 RX ORDER — ESCITALOPRAM OXALATE 10 MG/1
10 TABLET ORAL DAILY
Qty: 90 TABLET | Refills: 1 | Status: SHIPPED | OUTPATIENT
Start: 2023-02-21

## 2023-02-21 ASSESSMENT — PATIENT HEALTH QUESTIONNAIRE - PHQ9
7. TROUBLE CONCENTRATING ON THINGS, SUCH AS READING THE NEWSPAPER OR WATCHING TELEVISION: 1
SUM OF ALL RESPONSES TO PHQ QUESTIONS 1-9: 15
9. THOUGHTS THAT YOU WOULD BE BETTER OFF DEAD, OR OF HURTING YOURSELF: 1
SUM OF ALL RESPONSES TO PHQ QUESTIONS 1-9: 15
SUM OF ALL RESPONSES TO PHQ9 QUESTIONS 1 & 2: 6
1. LITTLE INTEREST OR PLEASURE IN DOING THINGS: 3
8. MOVING OR SPEAKING SO SLOWLY THAT OTHER PEOPLE COULD HAVE NOTICED. OR THE OPPOSITE, BEING SO FIGETY OR RESTLESS THAT YOU HAVE BEEN MOVING AROUND A LOT MORE THAN USUAL: 0
SUM OF ALL RESPONSES TO PHQ QUESTIONS 1-9: 14
4. FEELING TIRED OR HAVING LITTLE ENERGY: 3
6. FEELING BAD ABOUT YOURSELF - OR THAT YOU ARE A FAILURE OR HAVE LET YOURSELF OR YOUR FAMILY DOWN: 1
3. TROUBLE FALLING OR STAYING ASLEEP: 3
SUM OF ALL RESPONSES TO PHQ QUESTIONS 1-9: 15
2. FEELING DOWN, DEPRESSED OR HOPELESS: 3
10. IF YOU CHECKED OFF ANY PROBLEMS, HOW DIFFICULT HAVE THESE PROBLEMS MADE IT FOR YOU TO DO YOUR WORK, TAKE CARE OF THINGS AT HOME, OR GET ALONG WITH OTHER PEOPLE: 3
5. POOR APPETITE OR OVEREATING: 0

## 2023-02-21 ASSESSMENT — ENCOUNTER SYMPTOMS
CONSTIPATION: 0
EYE PAIN: 0
COUGH: 0
SINUS PAIN: 0
NAUSEA: 0
WHEEZING: 0
BACK PAIN: 0
SHORTNESS OF BREATH: 0
TROUBLE SWALLOWING: 0
ABDOMINAL PAIN: 0
DIARRHEA: 0
VOMITING: 0
SORE THROAT: 0
CHEST TIGHTNESS: 0

## 2023-02-21 ASSESSMENT — COLUMBIA-SUICIDE SEVERITY RATING SCALE - C-SSRS: 1. WITHIN THE PAST MONTH, HAVE YOU WISHED YOU WERE DEAD OR WISHED YOU COULD GO TO SLEEP AND NOT WAKE UP?: NO

## 2023-02-21 NOTE — PROGRESS NOTES
23    Name: Marcial Malik  :1975   Sex:female   Age:48 y.o. Chief Complaint   Patient presents with    Hyperlipidemia    Anxiety    Gastroesophageal Reflux     Patient presents to office for visit. She is tearful. Patient says that she is depressed and that she has been for the past few months. Patient says she cries at the drop of a hat. She can not sleep. Patient says she did have suicidal thoughts a couple of months ago. Patient has lost weight. She says she has been trying to lose weight. She has been taking GOLO. Patient says her periods are very heavy and very painful, she has large clots during her menses.      Here for recheck  Since weaning down the lexapro her anxiety has gotten a lot better but her depression is worse  Unfortunately she can pinpoint the problem  It is her   Mentally she feels he is pretty close to a narcisist, he is cold, pushes her away, does not show her any affection, very cold, tells her she let herself go that's why she got \"fat\"  Asked her about counseling and she says he made fun of her for wanting to do counseling  She will not even consider divorce though  She is trying to open his eye and make him see hwat he is doing  My fear is that thisis going to fail in a bad way  Right now she feels safe, he is just mean verbally & emotionally but that really is just as important  Spent  a lot oftime on this  She denies any thoughts of harming herself  She is working out 6 days a week  Eating better following golo  Feeling much healthier  Discussed meds with her, will add wellbutrin and recheck in a few months  Also not sleeping right now  Recommend melatonin    Lipids  Get labs today  Taking lipitor  Doing well  Likely even better     Heavy periods  If they get worse will refer to GYN'    Lucy Welch has been much better with change in diet and pantoprazole  She may not even need it right now  Okay to decrease to every tother day if she wants            Review of Systems Constitutional:  Negative for appetite change, fatigue and fever. HENT:  Negative for congestion, ear pain, sinus pain, sore throat and trouble swallowing. Eyes:  Negative for pain. Respiratory:  Negative for cough, chest tightness, shortness of breath and wheezing. Cardiovascular:  Negative for chest pain, palpitations and leg swelling. Gastrointestinal:  Negative for abdominal pain, constipation, diarrhea, nausea and vomiting. Endocrine: Negative for cold intolerance and heat intolerance. Genitourinary:  Negative for difficulty urinating, hematuria and pelvic pain. Musculoskeletal:  Negative for back pain, gait problem and joint swelling. Skin:  Negative for rash and wound. Neurological:  Negative for dizziness, syncope and headaches. Hematological:  Negative for adenopathy. Psychiatric/Behavioral:  Positive for dysphoric mood and sleep disturbance. Negative for confusion, self-injury and suicidal ideas. The patient is not nervous/anxious.           Current Outpatient Medications:     atorvastatin (LIPITOR) 20 MG tablet, Take 1 tablet by mouth daily, Disp: 90 tablet, Rfl: 1    escitalopram (LEXAPRO) 10 MG tablet, Take 1 tablet by mouth daily, Disp: 90 tablet, Rfl: 1    pantoprazole (PROTONIX) 40 MG tablet, Take 1 tablet by mouth nightly, Disp: 90 tablet, Rfl: 1    buPROPion (WELLBUTRIN XL) 150 MG extended release tablet, Take 1 tablet by mouth every morning, Disp: 90 tablet, Rfl: 0  Allergies   Allergen Reactions    Homatropine     Iodine     Penicillin G     Ranitidine Hives    Hydrocodone Rash      Past Medical History:   Diagnosis Date    Anxiety     Gastritis     Hemorrhoids     IBS (irritable bowel syndrome)     Irritable bowel syndrome     Panic attack     Sleep apnea     wears mouth guard, did not tolerate cpap     Patient Active Problem List    Diagnosis Date Noted    Perimenopausal 08/19/2021    Gastroesophageal reflux disease with esophagitis without hemorrhage 08/19/2021 Sleep apnea     Mixed hyperlipidemia 02/23/2021    Gastritis     Anxiety 09/04/2019      Past Surgical History:   Procedure Laterality Date    NECK SURGERY  1996    lipoma removal    TUBAL LIGATION      WRIST SURGERY Right     ganglion cyst      Social History       Tobacco History       Smoking Status  Never      Smokeless Tobacco Use  Never              Alcohol History       Alcohol Use Status  Yes Comment  occasionally              Drug Use       Drug Use Status  Never              Sexual Activity       Sexually Active  Yes Partners  Male Comment  -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 Constitutional:       General: She is not in acute distress. Appearance: She is well-developed. She is not ill-appearing or toxic-appearing. HENT:      Head: Normocephalic and atraumatic. Right Ear: Tympanic membrane normal.      Left Ear: Tympanic membrane normal.      Nose: No congestion. Mouth/Throat:      Mouth: Mucous membranes are moist.   Eyes:      Pupils: Pupils are equal, round, and reactive to light. Cardiovascular:      Rate and Rhythm: Normal rate and regular rhythm. Pulmonary:      Effort: Pulmonary effort is normal.      Breath sounds: Normal breath sounds. Abdominal:      General: Bowel sounds are normal.      Palpations: Abdomen is soft. Musculoskeletal:      Cervical back: Normal range of motion. Skin:     General: Skin is warm and dry. Neurological:      Mental Status: She is alert and oriented to person, place, and time. Psychiatric:         Mood and Affect: Mood normal.         Thought Content: Thought content normal.        Keny Marrufo was seen today for hyperlipidemia, anxiety and gastroesophageal reflux.     Diagnoses and all orders for this visit:    Encounter for well adult exam without abnormal findings    Mixed hyperlipidemia  Comments:  much improved with atorvastatin and losing weight  she is doing great  watching diet and eating healthier  Orders:  -     atorvastatin (LIPITOR) 20 MG tablet; Take 1 tablet by mouth daily  -     CBC with Auto Differential; Future  -     Comprehensive Metabolic Panel; Future  -     Lipid Panel; Future    Anxiety  Comments:  stable with lexapro  feels much better  decrease to 10mg doing great  Orders:  -     escitalopram (LEXAPRO) 10 MG tablet; Take 1 tablet by mouth daily    Palpitations  Comments:  much better since anxiety better  resolved, no issues in last few months  Orders:  -     escitalopram (LEXAPRO) 10 MG tablet; Take 1 tablet by mouth daily    Gastroesophageal reflux disease with esophagitis without hemorrhage  Comments:  has been stable  on protonix  will try to get it to prn status soon  Orders:  -     pantoprazole (PROTONIX) 40 MG tablet; Take 1 tablet by mouth nightly    Current moderate episode of major depressive disorder without prior episode [facility])  Comments:  add wellbutrin to the lexapro  recheck in a few months'  agreed any further suicide thought she will come to the office right away    Other orders  -     buPROPion (WELLBUTRIN XL) 150 MG extended release tablet; Take 1 tablet by mouth every morning      I independently reviewed and updated the chief complaint, HPI, past medical and surgical history, medications, allergies and ROS as entered by the LPN. Seen by:   Junie Lloyd, DO

## 2023-05-30 ENCOUNTER — OFFICE VISIT (OUTPATIENT)
Dept: FAMILY MEDICINE CLINIC | Age: 48
End: 2023-05-30
Payer: COMMERCIAL

## 2023-05-30 VITALS
HEART RATE: 84 BPM | HEIGHT: 63 IN | SYSTOLIC BLOOD PRESSURE: 110 MMHG | TEMPERATURE: 97.9 F | OXYGEN SATURATION: 98 % | BODY MASS INDEX: 24.1 KG/M2 | DIASTOLIC BLOOD PRESSURE: 78 MMHG | WEIGHT: 136 LBS

## 2023-05-30 DIAGNOSIS — K21.00 GASTROESOPHAGEAL REFLUX DISEASE WITH ESOPHAGITIS WITHOUT HEMORRHAGE: ICD-10-CM

## 2023-05-30 DIAGNOSIS — F41.9 ANXIETY: ICD-10-CM

## 2023-05-30 DIAGNOSIS — R00.2 PALPITATIONS: ICD-10-CM

## 2023-05-30 DIAGNOSIS — E78.2 MIXED HYPERLIPIDEMIA: Primary | ICD-10-CM

## 2023-05-30 DIAGNOSIS — T78.40XA ALLERGIC REACTION, INITIAL ENCOUNTER: ICD-10-CM

## 2023-05-30 PROCEDURE — 99214 OFFICE O/P EST MOD 30 MIN: CPT | Performed by: FAMILY MEDICINE

## 2023-05-30 RX ORDER — ESCITALOPRAM OXALATE 10 MG/1
10 TABLET ORAL DAILY
Qty: 90 TABLET | Refills: 1 | Status: SHIPPED | OUTPATIENT
Start: 2023-05-30

## 2023-05-30 RX ORDER — PREDNISONE 20 MG/1
TABLET ORAL
Qty: 8 TABLET | Refills: 0 | Status: SHIPPED | OUTPATIENT
Start: 2023-05-30

## 2023-05-30 RX ORDER — PANTOPRAZOLE SODIUM 40 MG/1
40 TABLET, DELAYED RELEASE ORAL NIGHTLY
Qty: 90 TABLET | Refills: 1 | Status: SHIPPED | OUTPATIENT
Start: 2023-05-30

## 2023-05-30 RX ORDER — ATORVASTATIN CALCIUM 20 MG/1
20 TABLET, FILM COATED ORAL DAILY
Qty: 90 TABLET | Refills: 1 | Status: SHIPPED | OUTPATIENT
Start: 2023-05-30

## 2023-05-30 ASSESSMENT — ENCOUNTER SYMPTOMS
DIARRHEA: 0
TROUBLE SWALLOWING: 0
CONSTIPATION: 0
COUGH: 0
WHEEZING: 0
NAUSEA: 0
BACK PAIN: 0
ABDOMINAL PAIN: 0
EYE PAIN: 0
CHEST TIGHTNESS: 0
VOMITING: 0
SHORTNESS OF BREATH: 0
SINUS PAIN: 0
SORE THROAT: 0

## 2023-05-30 NOTE — PROGRESS NOTES
23    Name: Kiah Willis  :1975   Sex:female   Age:48 y.o. Chief Complaint   Patient presents with    Anxiety    Hyperlipidemia    Gastroesophageal Reflux     Patient presents to office for visit. She got bit by something on the right side of her neck Saturday night. The area is red and swollen and warm. Patient says she stopped the wellbutrin. She could not tolerate the way the wellbutrin made her feel, she wasn't even able to walk on the treadmill when taking wellbutrin. Patient says currently the lexapro is effective by itself. Here for a check up  She has been doing well  She stopped the wellbutrin b/c it made her feel loopy  She is doing great with just the lexapro'  She feels the dose is good no changes needed    She is still exercising and weight is doing great  BMI is good at 24  Recommend she stay right around where she is  Protein each meal  And stay hydrated    Lipids  Still taking atorvastatin  Labs in February were great  No changes  Continue this   Labs in February or around then next year    [place]  Doing much better  On pantoprazole  Watching foods that trigger it  Has been feeling good'    Still some issues with relationship with  but she is not willing to end it right now  Has tried suggesting counseling but she knows that will not work'  She has been doing okay  [place]on snot feel in danger physically    Bite under chin too  Occurred saturday and getting worse  Skin feels tingling and tight, redness is moving over to the left side now  No trouble swallowing and not sure what she got bit by      Review of Systems   Constitutional:  Negative for appetite change, fatigue and fever. HENT:  Negative for congestion, ear pain, sinus pain, sore throat and trouble swallowing. Eyes:  Negative for pain. Respiratory:  Negative for cough, chest tightness, shortness of breath and wheezing. Cardiovascular:  Negative for chest pain, palpitations and leg swelling.

## 2023-11-28 ENCOUNTER — OFFICE VISIT (OUTPATIENT)
Dept: FAMILY MEDICINE CLINIC | Age: 48
End: 2023-11-28
Payer: COMMERCIAL

## 2023-11-28 VITALS
HEIGHT: 63 IN | WEIGHT: 135.8 LBS | BODY MASS INDEX: 24.06 KG/M2 | TEMPERATURE: 98.2 F | OXYGEN SATURATION: 97 % | HEART RATE: 86 BPM | SYSTOLIC BLOOD PRESSURE: 128 MMHG | DIASTOLIC BLOOD PRESSURE: 82 MMHG

## 2023-11-28 DIAGNOSIS — E78.2 MIXED HYPERLIPIDEMIA: ICD-10-CM

## 2023-11-28 DIAGNOSIS — K21.00 GASTROESOPHAGEAL REFLUX DISEASE WITH ESOPHAGITIS WITHOUT HEMORRHAGE: Primary | ICD-10-CM

## 2023-11-28 DIAGNOSIS — F41.9 ANXIETY: ICD-10-CM

## 2023-11-28 DIAGNOSIS — E07.9 THYROID DYSFUNCTION: ICD-10-CM

## 2023-11-28 DIAGNOSIS — Z12.31 ENCOUNTER FOR SCREENING MAMMOGRAM FOR BREAST CANCER: ICD-10-CM

## 2023-11-28 DIAGNOSIS — R00.2 PALPITATIONS: ICD-10-CM

## 2023-11-28 PROCEDURE — 99214 OFFICE O/P EST MOD 30 MIN: CPT | Performed by: FAMILY MEDICINE

## 2023-11-28 RX ORDER — PANTOPRAZOLE SODIUM 40 MG/1
40 TABLET, DELAYED RELEASE ORAL NIGHTLY
Qty: 90 TABLET | Refills: 1 | Status: SHIPPED | OUTPATIENT
Start: 2023-11-28

## 2023-11-28 RX ORDER — ESCITALOPRAM OXALATE 10 MG/1
10 TABLET ORAL DAILY
Qty: 90 TABLET | Refills: 1 | Status: SHIPPED | OUTPATIENT
Start: 2023-11-28

## 2023-11-28 RX ORDER — ATORVASTATIN CALCIUM 20 MG/1
20 TABLET, FILM COATED ORAL DAILY
Qty: 90 TABLET | Refills: 1 | Status: SHIPPED | OUTPATIENT
Start: 2023-11-28

## 2023-11-28 ASSESSMENT — PATIENT HEALTH QUESTIONNAIRE - PHQ9
SUM OF ALL RESPONSES TO PHQ QUESTIONS 1-9: 0
9. THOUGHTS THAT YOU WOULD BE BETTER OFF DEAD, OR OF HURTING YOURSELF: 0
SUM OF ALL RESPONSES TO PHQ9 QUESTIONS 1 & 2: 0
SUM OF ALL RESPONSES TO PHQ QUESTIONS 1-9: 0
2. FEELING DOWN, DEPRESSED OR HOPELESS: 0
10. IF YOU CHECKED OFF ANY PROBLEMS, HOW DIFFICULT HAVE THESE PROBLEMS MADE IT FOR YOU TO DO YOUR WORK, TAKE CARE OF THINGS AT HOME, OR GET ALONG WITH OTHER PEOPLE: 0
1. LITTLE INTEREST OR PLEASURE IN DOING THINGS: 0
3. TROUBLE FALLING OR STAYING ASLEEP: 0
8. MOVING OR SPEAKING SO SLOWLY THAT OTHER PEOPLE COULD HAVE NOTICED. OR THE OPPOSITE, BEING SO FIGETY OR RESTLESS THAT YOU HAVE BEEN MOVING AROUND A LOT MORE THAN USUAL: 0
6. FEELING BAD ABOUT YOURSELF - OR THAT YOU ARE A FAILURE OR HAVE LET YOURSELF OR YOUR FAMILY DOWN: 0
SUM OF ALL RESPONSES TO PHQ QUESTIONS 1-9: 0
5. POOR APPETITE OR OVEREATING: 0
4. FEELING TIRED OR HAVING LITTLE ENERGY: 0
SUM OF ALL RESPONSES TO PHQ QUESTIONS 1-9: 0
7. TROUBLE CONCENTRATING ON THINGS, SUCH AS READING THE NEWSPAPER OR WATCHING TELEVISION: 0

## 2023-11-28 ASSESSMENT — COLUMBIA-SUICIDE SEVERITY RATING SCALE - C-SSRS
7. DID THIS OCCUR IN THE LAST THREE MONTHS: NO
4. HAVE YOU HAD THESE THOUGHTS AND HAD SOME INTENTION OF ACTING ON THEM?: NO
5. HAVE YOU STARTED TO WORK OUT OR WORKED OUT THE DETAILS OF HOW TO KILL YOURSELF? DO YOU INTEND TO CARRY OUT THIS PLAN?: NO
3. HAVE YOU BEEN THINKING ABOUT HOW YOU MIGHT KILL YOURSELF?: NO

## 2023-11-28 ASSESSMENT — ENCOUNTER SYMPTOMS
SHORTNESS OF BREATH: 0
TROUBLE SWALLOWING: 0
DIARRHEA: 0
EYE PAIN: 0
SORE THROAT: 0
VOMITING: 0
ABDOMINAL PAIN: 0
CONSTIPATION: 0
COUGH: 0
CHEST TIGHTNESS: 0
WHEEZING: 0
NAUSEA: 0
SINUS PAIN: 0
BACK PAIN: 0

## 2024-03-26 ENCOUNTER — OFFICE VISIT (OUTPATIENT)
Dept: FAMILY MEDICINE CLINIC | Age: 49
End: 2024-03-26
Payer: COMMERCIAL

## 2024-03-26 VITALS
HEIGHT: 63 IN | OXYGEN SATURATION: 98 % | DIASTOLIC BLOOD PRESSURE: 82 MMHG | WEIGHT: 137 LBS | SYSTOLIC BLOOD PRESSURE: 128 MMHG | BODY MASS INDEX: 24.27 KG/M2 | HEART RATE: 100 BPM | RESPIRATION RATE: 18 BRPM | TEMPERATURE: 98.7 F

## 2024-03-26 DIAGNOSIS — J01.40 ACUTE NON-RECURRENT PANSINUSITIS: Primary | ICD-10-CM

## 2024-03-26 PROCEDURE — 99213 OFFICE O/P EST LOW 20 MIN: CPT | Performed by: FAMILY MEDICINE

## 2024-03-26 RX ORDER — PREDNISONE 10 MG/1
10 TABLET ORAL DAILY
Qty: 10 TABLET | Refills: 0 | Status: SHIPPED | OUTPATIENT
Start: 2024-03-26 | End: 2024-04-05

## 2024-03-26 RX ORDER — AZITHROMYCIN 250 MG/1
TABLET, FILM COATED ORAL
Qty: 6 TABLET | Refills: 0 | Status: SHIPPED | OUTPATIENT
Start: 2024-03-26 | End: 2024-04-05

## 2024-03-26 SDOH — ECONOMIC STABILITY: FOOD INSECURITY: WITHIN THE PAST 12 MONTHS, THE FOOD YOU BOUGHT JUST DIDN'T LAST AND YOU DIDN'T HAVE MONEY TO GET MORE.: NEVER TRUE

## 2024-03-26 SDOH — ECONOMIC STABILITY: HOUSING INSECURITY
IN THE LAST 12 MONTHS, WAS THERE A TIME WHEN YOU DID NOT HAVE A STEADY PLACE TO SLEEP OR SLEPT IN A SHELTER (INCLUDING NOW)?: NO

## 2024-03-26 SDOH — ECONOMIC STABILITY: FOOD INSECURITY: WITHIN THE PAST 12 MONTHS, YOU WORRIED THAT YOUR FOOD WOULD RUN OUT BEFORE YOU GOT MONEY TO BUY MORE.: NEVER TRUE

## 2024-03-26 SDOH — ECONOMIC STABILITY: INCOME INSECURITY: HOW HARD IS IT FOR YOU TO PAY FOR THE VERY BASICS LIKE FOOD, HOUSING, MEDICAL CARE, AND HEATING?: PATIENT DECLINED

## 2024-03-26 ASSESSMENT — PATIENT HEALTH QUESTIONNAIRE - PHQ9
SUM OF ALL RESPONSES TO PHQ QUESTIONS 1-9: 0
5. POOR APPETITE OR OVEREATING: NOT AT ALL
7. TROUBLE CONCENTRATING ON THINGS, SUCH AS READING THE NEWSPAPER OR WATCHING TELEVISION: NOT AT ALL
10. IF YOU CHECKED OFF ANY PROBLEMS, HOW DIFFICULT HAVE THESE PROBLEMS MADE IT FOR YOU TO DO YOUR WORK, TAKE CARE OF THINGS AT HOME, OR GET ALONG WITH OTHER PEOPLE: NOT DIFFICULT AT ALL
9. THOUGHTS THAT YOU WOULD BE BETTER OFF DEAD, OR OF HURTING YOURSELF: NOT AT ALL
4. FEELING TIRED OR HAVING LITTLE ENERGY: NOT AT ALL
1. LITTLE INTEREST OR PLEASURE IN DOING THINGS: NOT AT ALL
2. FEELING DOWN, DEPRESSED OR HOPELESS: NOT AT ALL
6. FEELING BAD ABOUT YOURSELF - OR THAT YOU ARE A FAILURE OR HAVE LET YOURSELF OR YOUR FAMILY DOWN: NOT AT ALL
SUM OF ALL RESPONSES TO PHQ QUESTIONS 1-9: 0
SUM OF ALL RESPONSES TO PHQ9 QUESTIONS 1 & 2: 0
SUM OF ALL RESPONSES TO PHQ QUESTIONS 1-9: 0
3. TROUBLE FALLING OR STAYING ASLEEP: NOT AT ALL
8. MOVING OR SPEAKING SO SLOWLY THAT OTHER PEOPLE COULD HAVE NOTICED. OR THE OPPOSITE, BEING SO FIGETY OR RESTLESS THAT YOU HAVE BEEN MOVING AROUND A LOT MORE THAN USUAL: NOT AT ALL
SUM OF ALL RESPONSES TO PHQ QUESTIONS 1-9: 0

## 2024-03-26 ASSESSMENT — ENCOUNTER SYMPTOMS
NAUSEA: 0
EYE REDNESS: 0
ALLERGIC/IMMUNOLOGIC NEGATIVE: 1
EYE DISCHARGE: 0
SORE THROAT: 1
PHOTOPHOBIA: 0
TROUBLE SWALLOWING: 0
BACK PAIN: 0
EYE PAIN: 0
COUGH: 1
BLOOD IN STOOL: 0
SINUS PRESSURE: 1
SHORTNESS OF BREATH: 0
VOMITING: 0
DIARRHEA: 0
SINUS PAIN: 0
CHEST TIGHTNESS: 0
ABDOMINAL PAIN: 0

## 2024-03-26 NOTE — PROGRESS NOTES
Abdominal:      General: Bowel sounds are normal.      Palpations: Abdomen is soft.   Musculoskeletal:         General: No tenderness or deformity. Normal range of motion.      Cervical back: Normal range of motion and neck supple.   Lymphadenopathy:      Cervical: No cervical adenopathy.   Skin:     General: Skin is warm and dry.      Capillary Refill: Capillary refill takes less than 2 seconds.      Findings: No rash.   Neurological:      Mental Status: She is alert and oriented to person, place, and time.      Sensory: No sensory deficit.      Motor: No abnormal muscle tone.      Coordination: Coordination normal.      Deep Tendon Reflexes: Reflexes normal.             Seen By:  Basim Alfredo DO

## 2024-05-15 DIAGNOSIS — E07.9 THYROID DYSFUNCTION: ICD-10-CM

## 2024-05-15 DIAGNOSIS — E78.2 MIXED HYPERLIPIDEMIA: ICD-10-CM

## 2024-05-15 LAB
ALBUMIN: 4.4 G/DL (ref 3.5–5.2)
ALP BLD-CCNC: 61 U/L (ref 35–104)
ALT SERPL-CCNC: 11 U/L (ref 0–32)
ANION GAP SERPL CALCULATED.3IONS-SCNC: 9 MMOL/L (ref 7–16)
AST SERPL-CCNC: 22 U/L (ref 0–31)
BASOPHILS ABSOLUTE: 0.07 K/UL (ref 0–0.2)
BASOPHILS RELATIVE PERCENT: 2 % (ref 0–2)
BILIRUB SERPL-MCNC: 0.4 MG/DL (ref 0–1.2)
BUN BLDV-MCNC: 16 MG/DL (ref 6–20)
CALCIUM SERPL-MCNC: 9.1 MG/DL (ref 8.6–10.2)
CHLORIDE BLD-SCNC: 104 MMOL/L (ref 98–107)
CHOLESTEROL, TOTAL: 161 MG/DL
CO2: 25 MMOL/L (ref 22–29)
CREAT SERPL-MCNC: 0.8 MG/DL (ref 0.5–1)
EOSINOPHILS ABSOLUTE: 0.21 K/UL (ref 0.05–0.5)
EOSINOPHILS RELATIVE PERCENT: 6 % (ref 0–6)
GFR, ESTIMATED: >90 ML/MIN/1.73M2
GLUCOSE BLD-MCNC: 87 MG/DL (ref 74–99)
HCT VFR BLD CALC: 39.9 % (ref 34–48)
HDLC SERPL-MCNC: 83 MG/DL
HEMOGLOBIN: 13 G/DL (ref 11.5–15.5)
IMMATURE GRANULOCYTES %: 0 % (ref 0–5)
IMMATURE GRANULOCYTES ABSOLUTE: <0.03 K/UL (ref 0–0.58)
LDL CHOLESTEROL: 65 MG/DL
LYMPHOCYTES ABSOLUTE: 0.9 K/UL (ref 1.5–4)
LYMPHOCYTES RELATIVE PERCENT: 24 % (ref 20–42)
MCH RBC QN AUTO: 30.6 PG (ref 26–35)
MCHC RBC AUTO-ENTMCNC: 32.6 G/DL (ref 32–34.5)
MCV RBC AUTO: 93.9 FL (ref 80–99.9)
MONOCYTES ABSOLUTE: 0.39 K/UL (ref 0.1–0.95)
MONOCYTES RELATIVE PERCENT: 10 % (ref 2–12)
NEUTROPHILS ABSOLUTE: 2.23 K/UL (ref 1.8–7.3)
NEUTROPHILS RELATIVE PERCENT: 59 % (ref 43–80)
PDW BLD-RTO: 12.1 % (ref 11.5–15)
PLATELET # BLD: 259 K/UL (ref 130–450)
PMV BLD AUTO: 10 FL (ref 7–12)
POTASSIUM SERPL-SCNC: 4.6 MMOL/L (ref 3.5–5)
RBC # BLD: 4.25 M/UL (ref 3.5–5.5)
SODIUM BLD-SCNC: 138 MMOL/L (ref 132–146)
TOTAL PROTEIN: 7 G/DL (ref 6.4–8.3)
TRIGL SERPL-MCNC: 64 MG/DL
TSH SERPL DL<=0.05 MIU/L-ACNC: 2.61 UIU/ML (ref 0.27–4.2)
VLDLC SERPL CALC-MCNC: 13 MG/DL
WBC # BLD: 3.8 K/UL (ref 4.5–11.5)

## 2024-05-21 ENCOUNTER — OFFICE VISIT (OUTPATIENT)
Dept: FAMILY MEDICINE CLINIC | Age: 49
End: 2024-05-21

## 2024-05-21 VITALS
OXYGEN SATURATION: 97 % | WEIGHT: 138.2 LBS | DIASTOLIC BLOOD PRESSURE: 82 MMHG | HEART RATE: 76 BPM | TEMPERATURE: 97.9 F | BODY MASS INDEX: 24.49 KG/M2 | SYSTOLIC BLOOD PRESSURE: 120 MMHG | HEIGHT: 63 IN

## 2024-05-21 DIAGNOSIS — E78.2 MIXED HYPERLIPIDEMIA: ICD-10-CM

## 2024-05-21 DIAGNOSIS — Z00.00 ENCOUNTER FOR WELL ADULT EXAM WITHOUT ABNORMAL FINDINGS: Primary | ICD-10-CM

## 2024-05-21 DIAGNOSIS — K21.00 GASTROESOPHAGEAL REFLUX DISEASE WITH ESOPHAGITIS WITHOUT HEMORRHAGE: ICD-10-CM

## 2024-05-21 DIAGNOSIS — F41.9 ANXIETY: ICD-10-CM

## 2024-05-21 DIAGNOSIS — F32.5 MAJOR DEPRESSIVE DISORDER WITH SINGLE EPISODE, IN FULL REMISSION (HCC): ICD-10-CM

## 2024-05-21 DIAGNOSIS — R00.2 PALPITATIONS: ICD-10-CM

## 2024-05-21 PROBLEM — F32.1 CURRENT MODERATE EPISODE OF MAJOR DEPRESSIVE DISORDER WITHOUT PRIOR EPISODE (HCC): Status: ACTIVE | Noted: 2024-05-21

## 2024-05-21 PROBLEM — F32.1 CURRENT MODERATE EPISODE OF MAJOR DEPRESSIVE DISORDER WITHOUT PRIOR EPISODE (HCC): Status: RESOLVED | Noted: 2024-05-21 | Resolved: 2024-05-21

## 2024-05-21 RX ORDER — ESCITALOPRAM OXALATE 10 MG/1
10 TABLET ORAL DAILY
Qty: 90 TABLET | Refills: 2 | Status: SHIPPED | OUTPATIENT
Start: 2024-05-21

## 2024-05-21 RX ORDER — ATORVASTATIN CALCIUM 20 MG/1
20 TABLET, FILM COATED ORAL DAILY
Qty: 90 TABLET | Refills: 2 | Status: SHIPPED | OUTPATIENT
Start: 2024-05-21

## 2024-05-21 RX ORDER — PANTOPRAZOLE SODIUM 40 MG/1
40 TABLET, DELAYED RELEASE ORAL NIGHTLY
Qty: 90 TABLET | Refills: 2 | Status: SHIPPED | OUTPATIENT
Start: 2024-05-21

## 2024-05-21 ASSESSMENT — ENCOUNTER SYMPTOMS
COUGH: 0
VOMITING: 0
EYE PAIN: 0
NAUSEA: 0
CONSTIPATION: 0
SHORTNESS OF BREATH: 0
BACK PAIN: 0
CHEST TIGHTNESS: 0
TROUBLE SWALLOWING: 0
DIARRHEA: 0
SINUS PAIN: 0
SORE THROAT: 0
ABDOMINAL PAIN: 0
WHEEZING: 0

## 2024-05-21 NOTE — PROGRESS NOTES
Hematological:  Negative for adenopathy.   Psychiatric/Behavioral:  Negative for confusion, dysphoric mood, self-injury, sleep disturbance and suicidal ideas. The patient is not nervous/anxious.            Current Outpatient Medications:     atorvastatin (LIPITOR) 20 MG tablet, Take 1 tablet by mouth daily, Disp: 90 tablet, Rfl: 2    escitalopram (LEXAPRO) 10 MG tablet, Take 1 tablet by mouth daily, Disp: 90 tablet, Rfl: 2    pantoprazole (PROTONIX) 40 MG tablet, Take 1 tablet by mouth nightly, Disp: 90 tablet, Rfl: 2  Allergies   Allergen Reactions    Bee Venom     Homatropine     Iodine     Penicillin G     Ranitidine Hives    Hydrocodone Rash      Past Medical History:   Diagnosis Date    Anxiety     Gastritis     Hemorrhoids     IBS (irritable bowel syndrome)     Irritable bowel syndrome     Panic attack     Sleep apnea     wears mouth guard, did not tolerate cpap     Patient Active Problem List    Diagnosis Date Noted    Perimenopausal 08/19/2021    Gastroesophageal reflux disease with esophagitis without hemorrhage 08/19/2021    Sleep apnea     Mixed hyperlipidemia 02/23/2021    Gastritis     Anxiety 09/04/2019      Past Surgical History:   Procedure Laterality Date    BREAST REDUCTION SURGERY Bilateral 2022    NECK SURGERY  1996    lipoma removal    TUBAL LIGATION      WRIST SURGERY Right     ganglion cyst      Social History       Tobacco History       Smoking Status  Never      Smokeless Tobacco Use  Never              Alcohol History       Alcohol Use Status  Yes Comment  occasionally              Drug Use       Drug Use Status  Never              Sexual Activity       Sexually Active  Yes Partners  Male Comment  -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   Constitutional:       General: She is not

## 2024-08-07 ENCOUNTER — OFFICE VISIT (OUTPATIENT)
Dept: ORTHOPEDIC SURGERY | Age: 49
End: 2024-08-07
Payer: COMMERCIAL

## 2024-08-07 ENCOUNTER — TELEPHONE (OUTPATIENT)
Dept: FAMILY MEDICINE CLINIC | Age: 49
End: 2024-08-07

## 2024-08-07 VITALS — WEIGHT: 135 LBS | BODY MASS INDEX: 23.92 KG/M2 | HEIGHT: 63 IN

## 2024-08-07 DIAGNOSIS — S16.1XXA ACUTE STRAIN OF NECK MUSCLE, INITIAL ENCOUNTER: ICD-10-CM

## 2024-08-07 DIAGNOSIS — M62.838 CERVICAL PARASPINAL MUSCLE SPASM: Primary | ICD-10-CM

## 2024-08-07 PROCEDURE — 99203 OFFICE O/P NEW LOW 30 MIN: CPT | Performed by: PHYSICIAN ASSISTANT

## 2024-08-07 RX ORDER — PREDNISONE 10 MG/1
TABLET ORAL
Qty: 24 TABLET | Refills: 0 | Status: SHIPPED | OUTPATIENT
Start: 2024-08-07

## 2024-08-07 RX ORDER — TIZANIDINE 2 MG/1
2 TABLET ORAL 3 TIMES DAILY PRN
Qty: 9 TABLET | Refills: 0 | Status: SHIPPED | OUTPATIENT
Start: 2024-08-07 | End: 2024-08-10

## 2024-08-07 NOTE — PROGRESS NOTES
[facility]  New Patient Note      CHIEF COMPLAINT:   Chief Complaint   Patient presents with    Neck Pain     Neck pain x 10 days, no known MARISABEL       HISTORY OF PRESENT ILLNESS:                The patient is a 49 y.o. female who presents today with complaints of neck pain x 10 days, no known MARISABEL.  Pt states started with headache on the left side then after a few days transitioned to right sided neck pain.  Pt localizes the pain to right side of the neck.  States for the last 6 years she has gotten an intense headache that doesn't usually last for more than a day or two.  Pt denies any numbness, tingling, loss of sensation or radiation of symptoms into fingertips.  Pain is worse with cervical ROM.  They have tried at home therapies of tens, heat.  Pt has never injured this neck in the past.        Past Medical History:        Diagnosis Date    Anxiety     Gastritis     Hemorrhoids     IBS (irritable bowel syndrome)     Irritable bowel syndrome     Panic attack     Sleep apnea     wears mouth guard, did not tolerate cpap     Past Surgical History:        Procedure Laterality Date    BREAST REDUCTION SURGERY Bilateral 2022    NECK SURGERY  1996    lipoma removal    TUBAL LIGATION      WRIST SURGERY Right     ganglion cyst     Current Medications:   No current facility-administered medications for this visit.  Allergies:  Bee venom, Homatropine, Iodine, Penicillin g, Ranitidine, and Hydrocodone    Social History:   TOBACCO:   reports that she has never smoked. She has never used smokeless tobacco.  ETOH:   reports current alcohol use.  DRUGS:   reports no history of drug use.    Review of Systems   Constitutional: Negative for fever, chills, diaphoresis, appetite change and fatigue.   HENT: Negative for dental issues, hearing loss and tinnitus. Negative for congestion, sinus pressure, sneezing, sore throat. Negative for headache.  Eyes: Negative for visual disturbance, blurred and double vision.

## 2024-08-07 NOTE — TELEPHONE ENCOUNTER
Sapna is having neck pain that has lasted for a couple days.  She is asking to see you for this today.  When can you see her?

## 2024-08-07 NOTE — PATIENT INSTRUCTIONS
*At this time I have recommended an oral steroid, Prednisone 10mg 3 tablets once daily by mouth for 4 days, then 2 tablets once daily for 4 days, then 1 tablet once daily for 4 days then STOP. I did discuss potential side effect such as GI upset, mood changes, depression, anxiety, change in sleep habits.  The patient develops any of these signs or symptoms they will call the office immediately and we will discontinue the medication in appropriate fashion.  They are aware that he should not use any other oral anti-inflammatories while on the oral steroids.  They can use Tylenol OTC PRN.    *Pt has been prescribed a muscle relaxer, Zanaflex 2mg 1 tablet by mouth three times daily as needed for muscle spasm for no more than 3 days dispense 9 tablets with zero refills.  Pt is aware they cannot drive or operate heavy machinery while on this medication.

## 2024-08-15 ENCOUNTER — TELEPHONE (OUTPATIENT)
Dept: ORTHOPEDIC SURGERY | Age: 49
End: 2024-08-15

## 2024-08-15 DIAGNOSIS — S16.1XXA ACUTE STRAIN OF NECK MUSCLE, INITIAL ENCOUNTER: ICD-10-CM

## 2024-08-15 DIAGNOSIS — M62.838 CERVICAL PARASPINAL MUSCLE SPASM: Primary | ICD-10-CM

## 2024-08-15 NOTE — TELEPHONE ENCOUNTER
LVM for patient letting her know that MRI was ordered and provided her with central scheduling phone number as well as our office number if she has questions.

## 2024-08-15 NOTE — TELEPHONE ENCOUNTER
Patient called in and stated that she has not noticed much improvement in her symptoms. States standing causes agonizing pain and she has a difficult time brushing her teeth.     Best call number is [phone]

## 2024-08-16 RX ORDER — MELOXICAM 15 MG/1
15 TABLET ORAL DAILY
Qty: 30 TABLET | Refills: 0 | Status: SHIPPED | OUTPATIENT
Start: 2024-08-16

## 2024-08-16 NOTE — TELEPHONE ENCOUNTER
Patient called today and stated that she was able to schedule her MRI, but not until 8/29/24. She asked if there was anything she could do in the meantime to help with symptoms. She finished the oral steroids, has taken the muscle relaxer, aleve and applied a heating pad and is not noticing any relief.

## 2024-08-29 ENCOUNTER — HOSPITAL ENCOUNTER (OUTPATIENT)
Dept: MRI IMAGING | Age: 49
Discharge: HOME | End: 2024-08-31
Payer: COMMERCIAL

## 2024-08-29 DIAGNOSIS — S16.1XXA ACUTE STRAIN OF NECK MUSCLE, INITIAL ENCOUNTER: ICD-10-CM

## 2024-08-29 DIAGNOSIS — M62.838 CERVICAL PARASPINAL MUSCLE SPASM: ICD-10-CM

## 2024-08-29 PROCEDURE — 72141 MRI NECK SPINE W/O DYE: CPT

## 2024-09-03 ENCOUNTER — TELEPHONE (OUTPATIENT)
Dept: ORTHOPEDIC SURGERY | Age: 49
End: 2024-09-03

## 2024-09-03 DIAGNOSIS — M62.838 CERVICAL PARASPINAL MUSCLE SPASM: Primary | ICD-10-CM

## 2024-09-03 DIAGNOSIS — M48.02 CERVICAL STENOSIS OF SPINAL CANAL: ICD-10-CM

## 2024-09-03 NOTE — TELEPHONE ENCOUNTER
I spoke with the pt and explained her results to her. Pt was instructed to f/u with PCP concerning swollen lymph nodes, and she expressed her understanding of this. Also agreed with your recommendation to f/u with Dr. Schaefer.

## 2024-09-03 NOTE — TELEPHONE ENCOUNTER
MRI shows mild central canal stenosis at C4-C5 and C5-C6.  She also has multilevel neural foraminal stenosis worse at C3-C4.  Additionally there are small disc herniations at various levels.  There was some lymphadenopathy noted on the MRI.  How she feeling, has she been ill recently.  Would recommend referral to Dr. Belle for further non-operative treatment of her neck.  F/U with Dr. Lanier regarding cervical lymph adenopathy.

## 2024-09-05 ENCOUNTER — OFFICE VISIT (OUTPATIENT)
Dept: FAMILY MEDICINE CLINIC | Age: 49
End: 2024-09-05
Payer: COMMERCIAL

## 2024-09-05 VITALS
DIASTOLIC BLOOD PRESSURE: 78 MMHG | HEIGHT: 63 IN | BODY MASS INDEX: 24.03 KG/M2 | SYSTOLIC BLOOD PRESSURE: 114 MMHG | HEART RATE: 78 BPM | OXYGEN SATURATION: 98 % | WEIGHT: 135.6 LBS | TEMPERATURE: 98 F

## 2024-09-05 DIAGNOSIS — R59.9 ENLARGEMENT OF LYMPH NODE: Primary | ICD-10-CM

## 2024-09-05 DIAGNOSIS — N60.91 BENIGN DISEASE OF RIGHT BREAST: ICD-10-CM

## 2024-09-05 PROCEDURE — 99214 OFFICE O/P EST MOD 30 MIN: CPT | Performed by: FAMILY MEDICINE

## 2024-09-05 ASSESSMENT — ENCOUNTER SYMPTOMS
NAUSEA: 0
EYE PAIN: 0
ABDOMINAL PAIN: 0
BACK PAIN: 0
WHEEZING: 0
CONSTIPATION: 0
TROUBLE SWALLOWING: 0
SORE THROAT: 0
SINUS PAIN: 0
SHORTNESS OF BREATH: 0
VOMITING: 0
COUGH: 0
DIARRHEA: 0
CHEST TIGHTNESS: 0

## 2024-09-05 NOTE — PROGRESS NOTES
24    Name: Sapna Thompson  :1975   Sex:female   Age:49 y.o.    Chief Complaint   Patient presents with    Lymphadenopathy    Neck Pain     Patient presents to office for visit. She was seeing Brooke at [facility] for neck pain that came on suddenly in the past 2 months, no known cause. Patient says she was having a ivette horse type pain from her right neck to her right shoulder. Patient had MRI of neck and enlarged lymph nodes were seen. Patient was advised to follow up with PCP. Patient has not had any illnesses or vaccines. She denies fever or trouble swallowing. She says for the past 4 to 5 months she will get a swollen lymph node in her right arm pit before her period starts and it will subside mid cycle. Patient does have history of breast reduction with lipo suction. She says the area that had lipo suction on her right was always larger and she was advised to see surgeon. Patient says this area on the right side of her breast/axilla has gotten larger and is tender to palpation. She is very concerned because she feels as if there is something in there. Patient is supposed to be referred for neck injections because the MRI of her neck was abnormal, this concerns her too, because she had no known injury.     Here for follow up  MRI of the neck showed incidental lymph node enlargement  Will refer to ENT, possible bx verses monitoring them, no recent illness, infection or vaccine  Hx decreased WBC last few labs, no increase in fatigue    Also refer to breast surgeon  She had reduction done with lipo suction and since then there has been uneven tissues on right verses the left but now the right axilla is bothering her more  She feels it is swelling off and on and having tenderness in this area  Up to date on mammograms  Refer to Dr Gan for another opinion    For neck reviewed the MRI with her  Degenerative changes  'her mom has bad osteoporosis and arthritis  No inflammatory arthritis

## 2024-09-06 ENCOUNTER — TELEPHONE (OUTPATIENT)
Dept: BREAST CENTER | Age: 49
End: 2024-09-06

## 2024-09-06 DIAGNOSIS — R59.0 ENLARGED LYMPH NODES IN ARMPIT: Primary | ICD-10-CM

## 2024-09-06 NOTE — TELEPHONE ENCOUNTER
Called and spoke with patient regarding her referral to the Breast clinic.  Per patient she has been complaining of right axilla swelling for the last 3-4 months. She states 2 years ago she had breast reduction with liposuction. She feels the right side was always a bit larger but feels as if it is even larger and more prominent. She does states she had a cervical MRI recently that stated there were enlarged lymph nodes throughout the cervical area. Her last screening mammogram was 12/23. Explained to patient we will obtain a bilateral diagnostic mammogram with right breast ultrasound and they can also concentrate on the right armpit area. Once completed, we will call and schedule her a consultation in the breast clinic.    Electronically signed by Yadira Segal RN on 9/6/24 at 11:21 AM EDT

## 2024-09-11 ENCOUNTER — OFFICE VISIT (OUTPATIENT)
Age: 49
End: 2024-09-11
Payer: COMMERCIAL

## 2024-09-11 VITALS
RESPIRATION RATE: 16 BRPM | HEIGHT: 63 IN | OXYGEN SATURATION: 96 % | DIASTOLIC BLOOD PRESSURE: 78 MMHG | TEMPERATURE: 98.1 F | WEIGHT: 137 LBS | SYSTOLIC BLOOD PRESSURE: 118 MMHG | BODY MASS INDEX: 24.27 KG/M2 | HEART RATE: 89 BPM

## 2024-09-11 DIAGNOSIS — M47.812 CERVICAL SPONDYLOSIS: ICD-10-CM

## 2024-09-11 DIAGNOSIS — M79.18 MYOFASCIAL PAIN SYNDROME: ICD-10-CM

## 2024-09-11 DIAGNOSIS — M48.02 NEUROFORAMINAL STENOSIS OF CERVICAL SPINE: ICD-10-CM

## 2024-09-11 DIAGNOSIS — G89.29 CHRONIC NECK PAIN: Primary | ICD-10-CM

## 2024-09-11 DIAGNOSIS — M54.2 CHRONIC NECK PAIN: Primary | ICD-10-CM

## 2024-09-11 DIAGNOSIS — M50.30 DEGENERATIVE DISC DISEASE, CERVICAL: ICD-10-CM

## 2024-09-11 PROCEDURE — 99204 OFFICE O/P NEW MOD 45 MIN: CPT | Performed by: STUDENT IN AN ORGANIZED HEALTH CARE EDUCATION/TRAINING PROGRAM

## 2024-09-16 ENCOUNTER — TELEPHONE (OUTPATIENT)
Dept: BREAST CENTER | Age: 49
End: 2024-09-16

## 2024-09-16 DIAGNOSIS — M79.89 RIGHT AXILLARY SWELLING: Primary | ICD-10-CM

## 2024-10-28 ENCOUNTER — OFFICE VISIT (OUTPATIENT)
Dept: ENT CLINIC | Age: 49
End: 2024-10-28

## 2024-10-28 VITALS — HEIGHT: 63 IN | BODY MASS INDEX: 26.22 KG/M2 | WEIGHT: 148 LBS

## 2024-10-28 DIAGNOSIS — R59.0 CERVICAL LYMPHADENOPATHY: ICD-10-CM

## 2024-10-28 DIAGNOSIS — E04.1 THYROID NODULE: Primary | ICD-10-CM

## 2024-10-28 NOTE — PROGRESS NOTES
[facility]  Dr. Henry Neal D.O. Ms.Ed.  New Consult       Patient Name:  Sapna Thompson  :  1975     CHIEF C/O:    Chief Complaint   Patient presents with    New Patient     Pt states she had an MRI done of her spine and she was told her lymph nodes in her neck are enlarged        HISTORY OBTAINED FROM:  patient    HISTORY OF PRESENT ILLNESS:       Sapna is a 49 y.o. year old female, here today for:       Patient is here for an incidental findings of bilateral cervical lymphadenopathy and MRI of the cervical spine is done for other concerns including neck pain.  Patient states she has no history of difficulty swallowing, has not noted any significant neck swelling, at the time of MRI is unsure if she had any other acute inflammatory process including sinus infection or viral illnesses occurring at the same time.  She does have intermittent seasonal allergies which she treats over-the-counter with success.  She has no other complaints today recent weight loss, no night sweats, no recent unusual fever or chills.  MRI of the cervical spine is independently reviewed interpreted, is as well as all outside records from previous physicians which were discussed with the patient as part of her history.           Past Medical History:   Diagnosis Date    Anxiety     Gastritis     Hemorrhoids     IBS (irritable bowel syndrome)     Irritable bowel syndrome     Panic attack     Sleep apnea     wears mouth guard, did not tolerate cpap     Past Surgical History:   Procedure Laterality Date    BREAST REDUCTION SURGERY Bilateral     COSMETIC SURGERY  2022    Breast reduction    NECK SURGERY  1996    lipoma removal    TUBAL LIGATION      WRIST SURGERY Right     ganglion cyst       Current Outpatient Medications:     TURMERIC CURCUMIN PO, Take by mouth, Disp: , Rfl:     atorvastatin (LIPITOR) 20 MG tablet, Take 1 tablet by mouth daily, Disp: 90 tablet, Rfl: 2    escitalopram (LEXAPRO) 10 MG tablet,

## 2024-10-29 ENCOUNTER — TELEPHONE (OUTPATIENT)
Dept: ENT CLINIC | Age: 49
End: 2024-10-29

## 2024-10-29 NOTE — TELEPHONE ENCOUNTER
Patient Thyroid US scheduled for soonest available on 11/12/24 @ 7:30am. ENT follow up has been pushed an additional week from 11/11/24 to 11/18/24.  
No

## 2024-11-01 ENCOUNTER — HOSPITAL ENCOUNTER (OUTPATIENT)
Dept: GENERAL RADIOLOGY | Age: 49
Discharge: HOME | End: 2024-11-03
Attending: SURGERY
Payer: COMMERCIAL

## 2024-11-01 DIAGNOSIS — R59.0 ENLARGED LYMPH NODES IN ARMPIT: ICD-10-CM

## 2024-11-01 DIAGNOSIS — M79.89 RIGHT AXILLARY SWELLING: ICD-10-CM

## 2024-11-01 PROCEDURE — G0279 TOMOSYNTHESIS, MAMMO: HCPCS

## 2024-11-01 PROCEDURE — 76642 ULTRASOUND BREAST LIMITED: CPT

## 2024-11-04 DIAGNOSIS — R59.0 AXILLARY ADENOPATHY: Primary | ICD-10-CM

## 2024-11-12 ENCOUNTER — HOSPITAL ENCOUNTER (OUTPATIENT)
Dept: ULTRASOUND IMAGING | Age: 49
Discharge: HOME | End: 2024-11-14
Attending: OTOLARYNGOLOGY
Payer: COMMERCIAL

## 2024-11-12 DIAGNOSIS — E04.1 THYROID NODULE: ICD-10-CM

## 2024-11-12 PROCEDURE — 76536 US EXAM OF HEAD AND NECK: CPT

## 2024-11-14 ENCOUNTER — HOSPITAL ENCOUNTER (OUTPATIENT)
Dept: GENERAL RADIOLOGY | Age: 49
Discharge: HOME | End: 2024-11-16
Attending: SURGERY
Payer: COMMERCIAL

## 2024-11-14 DIAGNOSIS — R59.0 AXILLARY ADENOPATHY: ICD-10-CM

## 2024-11-14 PROCEDURE — 2709999900 MAM NEEDLE BIOPSY LYMPH NODE SUPERFICIAL

## 2024-11-14 PROCEDURE — 77065 DX MAMMO INCL CAD UNI: CPT

## 2024-11-14 PROCEDURE — 10035 PLMT SFT TISS LOCLZJ DEV 1ST: CPT

## 2024-11-14 NOTE — PROGRESS NOTES
Met with patient and her  Bladimir prior to her biopsy. Instructed on ultrasound guided right axillary core needle biopsy with clip placement procedure. Denies use of blood thinners or aspirin products within the past 5 days. Instructed that results will be available in approximately 3-5 business days. She confirms that she has an active [facility] account and is agreeable to discussion of axillary biopsy results by phone. Instructed that her physician will also be notified of results. Provided with folder containing contact information and post biopsy discharge instructions. Instructed to call if she has any questions or concerns about her biopsy. Verbalizes understanding.

## 2024-11-15 LAB
SEND OUT REPORT: NORMAL
TEST NAME: NORMAL

## 2024-11-18 ENCOUNTER — TELEPHONE (OUTPATIENT)
Dept: BREAST CENTER | Age: 49
End: 2024-11-18

## 2024-11-18 ENCOUNTER — TELEPHONE (OUTPATIENT)
Dept: FAMILY MEDICINE CLINIC | Age: 49
End: 2024-11-18

## 2024-11-18 ENCOUNTER — OFFICE VISIT (OUTPATIENT)
Dept: ENT CLINIC | Age: 49
End: 2024-11-18
Payer: COMMERCIAL

## 2024-11-18 VITALS
SYSTOLIC BLOOD PRESSURE: 118 MMHG | TEMPERATURE: 97.9 F | HEART RATE: 84 BPM | DIASTOLIC BLOOD PRESSURE: 81 MMHG | WEIGHT: 149 LBS | BODY MASS INDEX: 26.4 KG/M2 | OXYGEN SATURATION: 98 % | HEIGHT: 63 IN

## 2024-11-18 DIAGNOSIS — F41.9 ANXIETY: ICD-10-CM

## 2024-11-18 DIAGNOSIS — R59.0 CERVICAL LYMPHADENOPATHY: Primary | ICD-10-CM

## 2024-11-18 DIAGNOSIS — R59.0 LYMPHADENOPATHY, AXILLARY: Primary | ICD-10-CM

## 2024-11-18 DIAGNOSIS — R00.2 PALPITATIONS: ICD-10-CM

## 2024-11-18 DIAGNOSIS — K21.00 GASTROESOPHAGEAL REFLUX DISEASE WITH ESOPHAGITIS WITHOUT HEMORRHAGE: ICD-10-CM

## 2024-11-18 DIAGNOSIS — E78.2 MIXED HYPERLIPIDEMIA: ICD-10-CM

## 2024-11-18 LAB
SEND OUT REPORT: NORMAL
TEST NAME: NORMAL

## 2024-11-18 PROCEDURE — 99213 OFFICE O/P EST LOW 20 MIN: CPT | Performed by: OTOLARYNGOLOGY

## 2024-11-18 RX ORDER — PANTOPRAZOLE SODIUM 40 MG/1
40 TABLET, DELAYED RELEASE ORAL NIGHTLY
Qty: 90 TABLET | Refills: 0 | Status: SHIPPED | OUTPATIENT
Start: 2024-11-18

## 2024-11-18 RX ORDER — ATORVASTATIN CALCIUM 20 MG/1
20 TABLET, FILM COATED ORAL DAILY
Qty: 90 TABLET | Refills: 0 | Status: SHIPPED | OUTPATIENT
Start: 2024-11-18

## 2024-11-18 RX ORDER — ESCITALOPRAM OXALATE 10 MG/1
10 TABLET ORAL DAILY
Qty: 90 TABLET | Refills: 0 | Status: SHIPPED | OUTPATIENT
Start: 2024-11-18

## 2024-11-18 ASSESSMENT — ENCOUNTER SYMPTOMS
COUGH: 0
VOMITING: 0
SHORTNESS OF BREATH: 0

## 2024-11-18 NOTE — TELEPHONE ENCOUNTER
Spoke with Sapna to reschedule appt that was on Tuesday 11/26 due to Dr Lanier being out of office; we have her rescheduled to Thursday 1/2 at 4:00 PM.    She recently filled her Lexapro, Pantoprazole & Atorvastatin but will need more refills on those sent to [facility] in [place]. Please reach out to Sapna with any questions.

## 2024-11-18 NOTE — TELEPHONE ENCOUNTER
Dr Guerra called and spoke with patient. Per his note: \"Discussed flow cytometry with patient is likley lymphoma.  Referral to med onc placed.  Followup with us prn\"     Called and spoke with patient who is agreeable with the plan.    Electronically signed by Yadira Segal RN on 11/18/24 at 1:40 PM EST

## 2024-11-18 NOTE — PROGRESS NOTES
me.  I agree with the assessment, plan and orders as documented by the resident.      Patient here for follow up of medical problems.         Remainder of medical problems as per resident note.      AYE OTERO, DO  11/21/24           Medical Necessity Clause: This procedure was medically necessary because the lesions that were treated were:

## 2024-11-20 ENCOUNTER — TELEPHONE (OUTPATIENT)
Dept: BREAST CENTER | Age: 49
End: 2024-11-20

## 2024-11-20 LAB — SURGICAL PATHOLOGY REPORT: NORMAL

## 2024-11-20 NOTE — TELEPHONE ENCOUNTER
Referral to [facility] has been submitted -- Patient information has been faxed - Their office will contact patient with an appointment.

## 2024-12-12 ENCOUNTER — HOSPITAL ENCOUNTER (OUTPATIENT)
Dept: CT IMAGING | Age: 49
Discharge: HOME | End: 2024-12-14
Attending: INTERNAL MEDICINE
Payer: COMMERCIAL

## 2024-12-12 DIAGNOSIS — C83.04 SMALL CELL B-CELL LYMPHOMA, LYMPH NODES OF AXILLA AND UPPER LIMB (HCC): ICD-10-CM

## 2024-12-12 PROCEDURE — 71250 CT THORAX DX C-: CPT

## 2024-12-12 PROCEDURE — 74176 CT ABD & PELVIS W/O CONTRAST: CPT

## 2024-12-30 ASSESSMENT — PATIENT HEALTH QUESTIONNAIRE - PHQ9
2. FEELING DOWN, DEPRESSED OR HOPELESS: NOT AT ALL
SUM OF ALL RESPONSES TO PHQ QUESTIONS 1-9: 0
SUM OF ALL RESPONSES TO PHQ QUESTIONS 1-9: 0
SUM OF ALL RESPONSES TO PHQ9 QUESTIONS 1 & 2: 0
SUM OF ALL RESPONSES TO PHQ QUESTIONS 1-9: 0
1. LITTLE INTEREST OR PLEASURE IN DOING THINGS: NOT AT ALL
SUM OF ALL RESPONSES TO PHQ QUESTIONS 1-9: 0
2. FEELING DOWN, DEPRESSED OR HOPELESS: NOT AT ALL
1. LITTLE INTEREST OR PLEASURE IN DOING THINGS: NOT AT ALL
SUM OF ALL RESPONSES TO PHQ9 QUESTIONS 1 & 2: 0

## 2025-01-02 ENCOUNTER — OFFICE VISIT (OUTPATIENT)
Dept: FAMILY MEDICINE CLINIC | Age: 50
End: 2025-01-02

## 2025-01-02 VITALS
TEMPERATURE: 98 F | HEART RATE: 78 BPM | OXYGEN SATURATION: 97 % | HEIGHT: 63 IN | SYSTOLIC BLOOD PRESSURE: 130 MMHG | BODY MASS INDEX: 25.98 KG/M2 | WEIGHT: 146.6 LBS | DIASTOLIC BLOOD PRESSURE: 82 MMHG

## 2025-01-02 DIAGNOSIS — R00.2 PALPITATIONS: ICD-10-CM

## 2025-01-02 DIAGNOSIS — C83.04 SMALL CELL B-CELL LYMPHOMA, LYMPH NODES OF AXILLA AND UPPER LIMB (HCC): ICD-10-CM

## 2025-01-02 DIAGNOSIS — K21.00 GASTROESOPHAGEAL REFLUX DISEASE WITH ESOPHAGITIS WITHOUT HEMORRHAGE: ICD-10-CM

## 2025-01-02 DIAGNOSIS — F41.9 ANXIETY: ICD-10-CM

## 2025-01-02 DIAGNOSIS — E78.2 MIXED HYPERLIPIDEMIA: Primary | ICD-10-CM

## 2025-01-02 DIAGNOSIS — Z23 IMMUNIZATION DUE: ICD-10-CM

## 2025-01-02 RX ORDER — ESCITALOPRAM OXALATE 10 MG/1
10 TABLET ORAL DAILY
Qty: 90 TABLET | Refills: 1 | Status: SHIPPED | OUTPATIENT
Start: 2025-01-02

## 2025-01-02 RX ORDER — ATORVASTATIN CALCIUM 20 MG/1
20 TABLET, FILM COATED ORAL DAILY
Qty: 90 TABLET | Refills: 1 | Status: SHIPPED | OUTPATIENT
Start: 2025-01-02

## 2025-01-02 RX ORDER — PANTOPRAZOLE SODIUM 40 MG/1
40 TABLET, DELAYED RELEASE ORAL NIGHTLY
Qty: 90 TABLET | Refills: 1 | Status: SHIPPED | OUTPATIENT
Start: 2025-01-02

## 2025-01-02 RX ORDER — ZANUBRUTINIB 80 MG/1
CAPSULE, GELATIN COATED ORAL
COMMUNITY
Start: 2024-12-27

## 2025-01-02 ASSESSMENT — PATIENT HEALTH QUESTIONNAIRE - PHQ9
4. FEELING TIRED OR HAVING LITTLE ENERGY: NOT AT ALL
SUM OF ALL RESPONSES TO PHQ QUESTIONS 1-9: 0
SUM OF ALL RESPONSES TO PHQ QUESTIONS 1-9: 0
9. THOUGHTS THAT YOU WOULD BE BETTER OFF DEAD, OR OF HURTING YOURSELF: NOT AT ALL
8. MOVING OR SPEAKING SO SLOWLY THAT OTHER PEOPLE COULD HAVE NOTICED. OR THE OPPOSITE, BEING SO FIGETY OR RESTLESS THAT YOU HAVE BEEN MOVING AROUND A LOT MORE THAN USUAL: NOT AT ALL
3. TROUBLE FALLING OR STAYING ASLEEP: NOT AT ALL
SUM OF ALL RESPONSES TO PHQ QUESTIONS 1-9: 0
10. IF YOU CHECKED OFF ANY PROBLEMS, HOW DIFFICULT HAVE THESE PROBLEMS MADE IT FOR YOU TO DO YOUR WORK, TAKE CARE OF THINGS AT HOME, OR GET ALONG WITH OTHER PEOPLE: NOT DIFFICULT AT ALL
SUM OF ALL RESPONSES TO PHQ9 QUESTIONS 1 & 2: 0
7. TROUBLE CONCENTRATING ON THINGS, SUCH AS READING THE NEWSPAPER OR WATCHING TELEVISION: NOT AT ALL
2. FEELING DOWN, DEPRESSED OR HOPELESS: NOT AT ALL
6. FEELING BAD ABOUT YOURSELF - OR THAT YOU ARE A FAILURE OR HAVE LET YOURSELF OR YOUR FAMILY DOWN: NOT AT ALL
5. POOR APPETITE OR OVEREATING: NOT AT ALL
SUM OF ALL RESPONSES TO PHQ QUESTIONS 1-9: 0
1. LITTLE INTEREST OR PLEASURE IN DOING THINGS: NOT AT ALL

## 2025-01-02 ASSESSMENT — ENCOUNTER SYMPTOMS
WHEEZING: 0
COUGH: 0
BACK PAIN: 0
VOMITING: 0
NAUSEA: 0
SORE THROAT: 0
DIARRHEA: 0
ABDOMINAL PAIN: 0
SINUS PAIN: 0
EYE PAIN: 0
CHEST TIGHTNESS: 0
TROUBLE SWALLOWING: 0
SHORTNESS OF BREATH: 0
CONSTIPATION: 0

## 2025-01-02 NOTE — PROGRESS NOTES
Future    Anxiety  Comments:  stable with lexapro  feels much better  decrease to 10mg doing great  no wellbutrin-had side effects  Orders:  -     escitalopram (LEXAPRO) 10 MG tablet; Take 1 tablet by mouth daily    Palpitations  Comments:  much better since anxiety better  resolved, no issues in last few months  Orders:  -     escitalopram (LEXAPRO) 10 MG tablet; Take 1 tablet by mouth daily    Gastroesophageal reflux disease with esophagitis without hemorrhage  Comments:  has been stable  on protonix  did not do well w/ prn, taking it nightly, much better  Orders:  -     pantoprazole (PROTONIX) 40 MG tablet; Take 1 tablet by mouth nightly    Small cell b-cell lymphoma, 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      Seen by:  Hanh Lanier DO

## 2025-06-23 DIAGNOSIS — E78.2 MIXED HYPERLIPIDEMIA: ICD-10-CM

## 2025-06-23 LAB
CHOLESTEROL, TOTAL: 176 MG/DL
HDLC SERPL-MCNC: 87 MG/DL
LDL CHOLESTEROL: 69 MG/DL
TRIGL SERPL-MCNC: 97 MG/DL
VLDLC SERPL CALC-MCNC: 19 MG/DL

## 2025-07-07 ENCOUNTER — OFFICE VISIT (OUTPATIENT)
Dept: FAMILY MEDICINE CLINIC | Age: 50
End: 2025-07-07
Payer: COMMERCIAL

## 2025-07-07 VITALS
HEART RATE: 84 BPM | OXYGEN SATURATION: 98 % | HEIGHT: 63 IN | TEMPERATURE: 98.1 F | BODY MASS INDEX: 25.59 KG/M2 | SYSTOLIC BLOOD PRESSURE: 128 MMHG | WEIGHT: 144.4 LBS | DIASTOLIC BLOOD PRESSURE: 88 MMHG

## 2025-07-07 DIAGNOSIS — Z91.030 BEE STING ALLERGY: ICD-10-CM

## 2025-07-07 DIAGNOSIS — F41.9 ANXIETY: ICD-10-CM

## 2025-07-07 DIAGNOSIS — Z00.01 ENCOUNTER FOR WELL ADULT EXAM WITH ABNORMAL FINDINGS: Primary | ICD-10-CM

## 2025-07-07 DIAGNOSIS — Z12.11 SCREEN FOR COLON CANCER: ICD-10-CM

## 2025-07-07 DIAGNOSIS — E78.2 MIXED HYPERLIPIDEMIA: ICD-10-CM

## 2025-07-07 DIAGNOSIS — K21.00 GASTROESOPHAGEAL REFLUX DISEASE WITH ESOPHAGITIS WITHOUT HEMORRHAGE: ICD-10-CM

## 2025-07-07 DIAGNOSIS — R00.2 PALPITATIONS: ICD-10-CM

## 2025-07-07 PROCEDURE — 99396 PREV VISIT EST AGE 40-64: CPT | Performed by: FAMILY MEDICINE

## 2025-07-07 RX ORDER — EPINEPHRINE 0.3 MG/.3ML
0.3 INJECTION SUBCUTANEOUS ONCE
Qty: 2 EACH | Refills: 1 | Status: SHIPPED | OUTPATIENT
Start: 2025-07-07 | End: 2025-07-07

## 2025-07-07 RX ORDER — PANTOPRAZOLE SODIUM 40 MG/1
40 TABLET, DELAYED RELEASE ORAL NIGHTLY
Qty: 90 TABLET | Refills: 2 | Status: SHIPPED | OUTPATIENT
Start: 2025-07-07

## 2025-07-07 RX ORDER — EPINEPHRINE 0.3 MG/.3ML
0.3 INJECTION SUBCUTANEOUS PRN
COMMUNITY
End: 2025-07-07 | Stop reason: SDUPTHER

## 2025-07-07 RX ORDER — ATORVASTATIN CALCIUM 20 MG/1
20 TABLET, FILM COATED ORAL DAILY
Qty: 90 TABLET | Refills: 2 | Status: SHIPPED | OUTPATIENT
Start: 2025-07-07

## 2025-07-07 RX ORDER — ESCITALOPRAM OXALATE 10 MG/1
10 TABLET ORAL DAILY
Qty: 90 TABLET | Refills: 2 | Status: SHIPPED | OUTPATIENT
Start: 2025-07-07

## 2025-07-07 SDOH — ECONOMIC STABILITY: FOOD INSECURITY: WITHIN THE PAST 12 MONTHS, THE FOOD YOU BOUGHT JUST DIDN'T LAST AND YOU DIDN'T HAVE MONEY TO GET MORE.: NEVER TRUE

## 2025-07-07 SDOH — ECONOMIC STABILITY: FOOD INSECURITY: WITHIN THE PAST 12 MONTHS, YOU WORRIED THAT YOUR FOOD WOULD RUN OUT BEFORE YOU GOT MONEY TO BUY MORE.: NEVER TRUE

## 2025-07-07 ASSESSMENT — ENCOUNTER SYMPTOMS
CONSTIPATION: 0
SORE THROAT: 0
TROUBLE SWALLOWING: 0
WHEEZING: 0
SINUS PAIN: 0
VOMITING: 0
ABDOMINAL PAIN: 0
COUGH: 0
EYE PAIN: 0
BACK PAIN: 0
SHORTNESS OF BREATH: 0
NAUSEA: 0
COLOR CHANGE: 1
DIARRHEA: 0
CHEST TIGHTNESS: 0

## 2025-07-07 ASSESSMENT — PATIENT HEALTH QUESTIONNAIRE - PHQ9
8. MOVING OR SPEAKING SO SLOWLY THAT OTHER PEOPLE COULD HAVE NOTICED. OR THE OPPOSITE, BEING SO FIGETY OR RESTLESS THAT YOU HAVE BEEN MOVING AROUND A LOT MORE THAN USUAL: NOT AT ALL
4. FEELING TIRED OR HAVING LITTLE ENERGY: NOT AT ALL
10. IF YOU CHECKED OFF ANY PROBLEMS, HOW DIFFICULT HAVE THESE PROBLEMS MADE IT FOR YOU TO DO YOUR WORK, TAKE CARE OF THINGS AT HOME, OR GET ALONG WITH OTHER PEOPLE: NOT DIFFICULT AT ALL
5. POOR APPETITE OR OVEREATING: NOT AT ALL
6. FEELING BAD ABOUT YOURSELF - OR THAT YOU ARE A FAILURE OR HAVE LET YOURSELF OR YOUR FAMILY DOWN: NOT AT ALL
SUM OF ALL RESPONSES TO PHQ QUESTIONS 1-9: 0
SUM OF ALL RESPONSES TO PHQ QUESTIONS 1-9: 0
3. TROUBLE FALLING OR STAYING ASLEEP: NOT AT ALL
1. LITTLE INTEREST OR PLEASURE IN DOING THINGS: NOT AT ALL
SUM OF ALL RESPONSES TO PHQ QUESTIONS 1-9: 0
7. TROUBLE CONCENTRATING ON THINGS, SUCH AS READING THE NEWSPAPER OR WATCHING TELEVISION: NOT AT ALL
2. FEELING DOWN, DEPRESSED OR HOPELESS: NOT AT ALL
9. THOUGHTS THAT YOU WOULD BE BETTER OFF DEAD, OR OF HURTING YOURSELF: NOT AT ALL
SUM OF ALL RESPONSES TO PHQ QUESTIONS 1-9: 0

## 2025-07-07 NOTE — PROGRESS NOTES
25    Name: Sapna Thompson  :1975   Sex:female   Age:50 y.o.    Chief Complaint   Patient presents with    Hyperlipidemia    Anxiety    Insect Bite     wasp     Patient presents to office for visit. She did have labs done. Patient was stung on her left lower arm on 7/3/25 by a wasp. She did go to urgent care on 25 and was given steroid injection and steroid pack. Patient continues to have  redness, warmth, and swelling to her left lower arm. She does not have an epi pen at home.     Here for check up  Doing well  She was just recently stung by wasp on left forearm, went to urgent care the next day- and had depo shot and oral steroids, still taking those  Left fore arm swelling is getting better but still itching  But she feels it is getting much better    B cell lymphoma  Doing better with Brukinsa, less side effects and labs have been stable, still seeing DR Ezekiel Gold  On lexapro and doing well  She feels the dose is really good  No changes    Gerd  On protonix and doing really well   Refills sent in    Mammogram up to date, no changes    Hyperlipidemia  On atorvastatin doing really well no changes  Labs are great, no issues      Review of Systems   Constitutional:  Negative for appetite change, fatigue and fever.   HENT:  Negative for congestion, ear pain, sinus pain, sore throat and trouble swallowing.    Eyes:  Negative for pain.   Respiratory:  Negative for cough, chest tightness, shortness of breath and wheezing.    Cardiovascular:  Negative for chest pain, palpitations and leg swelling.   Gastrointestinal:  Negative for abdominal pain, constipation, diarrhea, nausea and vomiting.   Endocrine: Negative for cold intolerance and heat intolerance.   Genitourinary:  Negative for difficulty urinating, hematuria and pelvic pain.   Musculoskeletal:  Negative for back pain, gait problem and joint swelling.   Skin:  Positive for color change and wound. Negative for rash.   Neurological:

## 2025-08-06 LAB
COCONUT IGE QN: <0.1 KU/L (ref 0–0.34)
HONEY BEE IGE QN: <0.1 KU/L (ref 0–0.34)
IGE SERPL-ACNC: 2144 IU/ML (ref 0–100)
PAPER WASP IGE QN: >100 KU/L (ref 0–0.34)
WHITEFACED HORNET IGE QN: 6.93 KU/L (ref 0–0.34)
YELLOW HORNET IGE QN: 10.3 KU/L (ref 0–0.34)
YELLOW JACKET IGE QN: >100 KU/L (ref 0–0.34)